# Patient Record
Sex: MALE | Race: WHITE | HISPANIC OR LATINO | Employment: FULL TIME | URBAN - METROPOLITAN AREA
[De-identification: names, ages, dates, MRNs, and addresses within clinical notes are randomized per-mention and may not be internally consistent; named-entity substitution may affect disease eponyms.]

---

## 2017-02-02 ENCOUNTER — HOSPITAL ENCOUNTER (EMERGENCY)
Facility: HOSPITAL | Age: 32
Discharge: HOME | End: 2017-02-02
Attending: EMERGENCY MEDICINE | Admitting: EMERGENCY MEDICINE

## 2017-02-02 VITALS
HEART RATE: 105 BPM | OXYGEN SATURATION: 97 % | DIASTOLIC BLOOD PRESSURE: 83 MMHG | TEMPERATURE: 99.1 F | HEIGHT: 71 IN | SYSTOLIC BLOOD PRESSURE: 134 MMHG | BODY MASS INDEX: 38.5 KG/M2 | RESPIRATION RATE: 18 BRPM | WEIGHT: 275 LBS

## 2017-02-02 DIAGNOSIS — J45.901 ASTHMA EXACERBATION: Primary | ICD-10-CM

## 2017-02-02 PROCEDURE — 99283 EMERGENCY DEPT VISIT LOW MDM: CPT

## 2017-02-02 RX ORDER — PROMETHAZINE HYDROCHLORIDE AND CODEINE PHOSPHATE 6.25; 1 MG/5ML; MG/5ML
5 SYRUP ORAL EVERY 4 HOURS PRN
Qty: 120 ML | Refills: 0 | Status: SHIPPED | OUTPATIENT
Start: 2017-02-02 | End: 2017-02-12

## 2017-02-02 RX ORDER — BUDESONIDE AND FORMOTEROL FUMARATE DIHYDRATE 160; 4.5 UG/1; UG/1
AEROSOL RESPIRATORY (INHALATION)
COMMUNITY
Start: 2016-11-14 | End: 2018-12-21 | Stop reason: SDUPTHER

## 2017-02-02 RX ORDER — ALBUTEROL SULFATE 90 UG/1
AEROSOL, METERED RESPIRATORY (INHALATION)
COMMUNITY
Start: 2015-11-17 | End: 2018-12-21 | Stop reason: SDUPTHER

## 2017-02-02 RX ORDER — PREDNISONE 20 MG/1
60 TABLET ORAL DAILY
Qty: 15 TABLET | Refills: 0 | Status: SHIPPED | OUTPATIENT
Start: 2017-02-02 | End: 2017-02-07

## 2017-02-02 RX ORDER — AZITHROMYCIN 250 MG/1
TABLET, FILM COATED ORAL
Qty: 6 TABLET | Refills: 0 | Status: SHIPPED | OUTPATIENT
Start: 2017-02-02 | End: 2018-12-05 | Stop reason: ALTCHOICE

## 2017-06-17 ENCOUNTER — APPOINTMENT (EMERGENCY)
Dept: ULTRASOUND IMAGING | Facility: HOSPITAL | Age: 32
End: 2017-06-17
Payer: OTHER MISCELLANEOUS

## 2017-06-17 ENCOUNTER — HOSPITAL ENCOUNTER (EMERGENCY)
Facility: HOSPITAL | Age: 32
Discharge: HOME | End: 2017-06-17
Attending: EMERGENCY MEDICINE | Admitting: EMERGENCY MEDICINE
Payer: OTHER MISCELLANEOUS

## 2017-06-17 VITALS
HEIGHT: 71 IN | WEIGHT: 275 LBS | BODY MASS INDEX: 38.5 KG/M2 | RESPIRATION RATE: 18 BRPM | TEMPERATURE: 99.2 F | OXYGEN SATURATION: 96 % | HEART RATE: 86 BPM | DIASTOLIC BLOOD PRESSURE: 64 MMHG | SYSTOLIC BLOOD PRESSURE: 141 MMHG

## 2017-06-17 DIAGNOSIS — S76.212A STRAIN OF GROIN, LEFT, INITIAL ENCOUNTER: Primary | ICD-10-CM

## 2017-06-17 PROCEDURE — 99283 EMERGENCY DEPT VISIT LOW MDM: CPT

## 2017-06-17 PROCEDURE — 76870 US EXAM SCROTUM: CPT

## 2017-06-17 RX ORDER — NAPROXEN 250 MG/1
500 TABLET ORAL ONCE
Status: COMPLETED | OUTPATIENT
Start: 2017-06-17 | End: 2017-06-17

## 2017-06-17 RX ORDER — NAPROXEN 500 MG/1
500 TABLET ORAL 2 TIMES DAILY WITH MEALS
Qty: 14 TABLET | Refills: 0 | Status: SHIPPED | OUTPATIENT
Start: 2017-06-17 | End: 2020-03-23 | Stop reason: ALTCHOICE

## 2017-06-17 RX ADMIN — NAPROXEN 500 MG: 250 TABLET ORAL at 16:07

## 2017-06-27 ENCOUNTER — APPOINTMENT (OUTPATIENT)
Dept: URGENT CARE | Facility: MEDICAL CENTER | Age: 32
End: 2017-06-27
Payer: OTHER MISCELLANEOUS

## 2017-06-27 PROCEDURE — 99283 EMERGENCY DEPT VISIT LOW MDM: CPT

## 2017-06-27 PROCEDURE — G0382 LEV 3 HOSP TYPE B ED VISIT: HCPCS

## 2017-06-30 ENCOUNTER — APPOINTMENT (OUTPATIENT)
Dept: URGENT CARE | Facility: MEDICAL CENTER | Age: 32
End: 2017-06-30
Payer: OTHER MISCELLANEOUS

## 2017-06-30 PROCEDURE — 99213 OFFICE O/P EST LOW 20 MIN: CPT

## 2017-07-07 ENCOUNTER — APPOINTMENT (OUTPATIENT)
Dept: URGENT CARE | Facility: MEDICAL CENTER | Age: 32
End: 2017-07-07
Payer: OTHER MISCELLANEOUS

## 2017-07-07 PROCEDURE — 99213 OFFICE O/P EST LOW 20 MIN: CPT

## 2018-10-05 ENCOUNTER — TELEPHONE (OUTPATIENT)
Dept: FAMILY MEDICINE CLINIC | Facility: CLINIC | Age: 33
End: 2018-10-05

## 2018-10-10 ENCOUNTER — TELEPHONE (OUTPATIENT)
Dept: FAMILY MEDICINE CLINIC | Facility: CLINIC | Age: 33
End: 2018-10-10

## 2018-10-10 NOTE — TELEPHONE ENCOUNTER
Pt left a message requesting a refill on her Inhaler  She needs an appt  Last office visit was 10/26/2016 as a new pt

## 2018-12-03 ENCOUNTER — HOSPITAL ENCOUNTER (EMERGENCY)
Facility: HOSPITAL | Age: 33
Discharge: HOME | End: 2018-12-03
Attending: EMERGENCY MEDICINE
Payer: COMMERCIAL

## 2018-12-03 VITALS
RESPIRATION RATE: 16 BRPM | OXYGEN SATURATION: 98 % | HEART RATE: 72 BPM | WEIGHT: 290 LBS | TEMPERATURE: 98.4 F | DIASTOLIC BLOOD PRESSURE: 91 MMHG | SYSTOLIC BLOOD PRESSURE: 167 MMHG | BODY MASS INDEX: 40.45 KG/M2

## 2018-12-03 DIAGNOSIS — G50.0 TRIGEMINAL NEURALGIA: Primary | ICD-10-CM

## 2018-12-03 PROCEDURE — 96372 THER/PROPH/DIAG INJ SC/IM: CPT

## 2018-12-03 PROCEDURE — 99283 EMERGENCY DEPT VISIT LOW MDM: CPT

## 2018-12-03 RX ORDER — GABAPENTIN 300 MG/1
300 CAPSULE ORAL
Qty: 5 CAPSULE | Refills: 0 | Status: SHIPPED | OUTPATIENT
Start: 2018-12-03 | End: 2020-03-23 | Stop reason: ALTCHOICE

## 2018-12-03 RX ORDER — KETOROLAC TROMETHAMINE 30 MG/ML
15 INJECTION, SOLUTION INTRAMUSCULAR; INTRAVENOUS ONCE
Status: COMPLETED | OUTPATIENT
Start: 2018-12-03 | End: 2018-12-03

## 2018-12-03 RX ORDER — DIAZEPAM 5 MG/ML
5 INJECTION, SOLUTION INTRAMUSCULAR; INTRAVENOUS ONCE
Status: COMPLETED | OUTPATIENT
Start: 2018-12-03 | End: 2018-12-03

## 2018-12-03 RX ADMIN — Medication 5 MG: at 09:58

## 2018-12-03 RX ADMIN — KETOROLAC TROMETHAMINE 15 MG: 30 INJECTION, SOLUTION INTRAMUSCULAR at 09:58

## 2018-12-03 NOTE — ED PROVIDER NOTES
History  Chief Complaint   Patient presents with    Jaw Pain     pt c/o jaw pain that radiates into his head that has been intermittent for the past week but became much worse yesterday     This is a 67-year-old male patient who states for approximately 1 week he has had pain in the right lower draw rating up his face and sometimes causing him a headache  Over last 2 days it has been more constant  The episodes are described as sharp and lancinating  He has tried Tylenol and Motrin with minimal improvement  No temple pain no blurred vision or double vision no photophobia  The pain does come and go  No recent dental work  Denies any toothache  Nothing makes it better or worse  No sign of Bradley's angina he denies any swelling of the face  No fever no chills no cough congestion sore throat no nausea vomiting diarrhea abdominal pain no chest pain or shortness of breath no urinary symptoms  Patient denies grinding his teeth  Prior to Admission Medications   Prescriptions Last Dose Informant Patient Reported? Taking? albuterol (VENTOLIN HFA) 90 mcg/act inhaler   Yes No   Sig: Inhale   azithromycin (ZITHROMAX) 250 mg tablet   No No   Sig: Take 500 mg day 1, 250 mg days 2-5   budesonide-formoterol (SYMBICORT) 160-4 5 mcg/act inhaler   Yes No   Sig: Inhale   ipratropium (ATROVENT) 0 02 % nebulizer solution   No No   Sig: Take 2 5 mL by nebulization 4 (four) times a day for 10 days   naproxen (NAPROSYN) 500 mg tablet   No No   Sig: Take 1 tablet by mouth 2 (two) times a day with meals for 7 days      Facility-Administered Medications: None       Past Medical History:   Diagnosis Date    Asthma        Past Surgical History:   Procedure Laterality Date    HERNIA REPAIR         Family History   Problem Relation Age of Onset    No Known Problems Father      I have reviewed and agree with the history as documented      Social History   Substance Use Topics    Smoking status: Never Smoker    Smokeless tobacco: Never Used    Alcohol use Yes      Comment: socially        Review of Systems   All other systems reviewed and are negative  Physical Exam  Physical Exam   Constitutional: He appears well-developed and well-nourished  HENT:   Head: Normocephalic and atraumatic  Right Ear: External ear normal    Left Ear: External ear normal    Nose: Nose normal    Mouth/Throat: Oropharynx is clear and moist    Eyes: Pupils are equal, round, and reactive to light  Conjunctivae are normal    Neck: Normal range of motion  Neck supple  Cardiovascular: Normal rate and regular rhythm  Pulmonary/Chest: Effort normal and breath sounds normal    Abdominal: Soft  Bowel sounds are normal  There is no tenderness  Neurological: He is alert  Skin: Skin is warm  Psychiatric: He has a normal mood and affect  His behavior is normal    Nursing note and vitals reviewed  Vital Signs  ED Triage Vitals [12/03/18 0859]   Temperature Pulse Respirations Blood Pressure SpO2   98 4 °F (36 9 °C) 72 16 167/91 98 %      Temp Source Heart Rate Source Patient Position - Orthostatic VS BP Location FiO2 (%)   Oral Monitor Sitting Left arm --      Pain Score       Worst Possible Pain           Vitals:    12/03/18 0859   BP: 167/91   Pulse: 72   Patient Position - Orthostatic VS: Sitting       Visual Acuity      ED Medications  Medications   ketorolac (TORADOL) injection 15 mg (15 mg Intramuscular Given 12/3/18 0958)   diazepam (VALIUM) injection 5 mg (5 mg Intramuscular Given 12/3/18 2141)       Diagnostic Studies  Results Reviewed     None                 No orders to display              Procedures  Procedures       Phone Contacts  ED Phone Contact    ED Course  ED Course as of Dec 04 0559   Mon Dec 03, 2018   1032 Patient was given Valium and Toradol eyes complete resolution of the pain symptoms  He is nontoxic in no acute distress I believe this to be of trigeminal neuralgia    Patient is to follow up with family doctor once a told him to continue                                MDM  CritCare Time    Disposition  Final diagnoses:   Trigeminal neuralgia     Time reflects when diagnosis was documented in both MDM as applicable and the Disposition within this note     Time User Action Codes Description Comment    12/3/2018 10:33 AM VinChivo barbozak Add [G50 0] Trigeminal neuralgia       ED Disposition     ED Disposition Condition Comment    Discharge  [facility] discharge to home/self care  Condition at discharge: Good        Follow-up Information     Follow up With Specialties Details Why [address] [facility], DO Family Medicine  As planned [facility] [address]  [address]  [phone]            Discharge Medication List as of 12/3/2018 10:34 AM      CONTINUE these medications which have NOT CHANGED    Details   albuterol (VENTOLIN HFA) 90 mcg/act inhaler Inhale, Starting 11/17/2015, Until Discontinued, Historical Med      azithromycin (ZITHROMAX) 250 mg tablet Take 500 mg day 1, 250 mg days 2-5, Print      budesonide-formoterol (SYMBICORT) 160-4 5 mcg/act inhaler Inhale, Starting 11/14/2016, Until Discontinued, Historical Med      ipratropium (ATROVENT) 0 02 % nebulizer solution Take 2 5 mL by nebulization 4 (four) times a day for 10 days, Starting 2/2/2017, Until Sun 2/12/17, Print      naproxen (NAPROSYN) 500 mg tablet Take 1 tablet by mouth 2 (two) times a day with meals for 7 days, Starting Sat 6/17/2017, Until Sat 6/24/2017, Print           No discharge procedures on file      ED Provider  Electronically Signed by           Mateo Hurd PA-C  12/04/18 1005

## 2018-12-03 NOTE — DISCHARGE INSTRUCTIONS
Trigeminal Neuralgia   WHAT YOU NEED TO KNOW:   Trigeminal neuralgia (TN) is a problem with your trigeminal nerve that causes severe facial pain  You have a trigeminal nerve on each side of your face  The nerves allow you to feel pain, touch, and temperature changes in different areas of your face  DISCHARGE INSTRUCTIONS:   Medicines:  Do not stop taking your medicines without talking with your healthcare provider first  Ken James can have a bad reaction if you stop your TN medicines suddenly  You may need any of the following:  · Anticonvulsants: These control seizures, help prevent pain attacks, and decrease symptoms  · Antidepressants: These decrease pain and help prevent depression  · Muscle relaxers:  When your facial muscles are relaxed, you may be less likely to have pain attacks  · Pain medicines: You may be given pain medicines if your facial pain is severe  · Take your medicine as directed  Contact your healthcare provider if you think your medicine is not helping or if you have side effects  Tell him or her if you are allergic to any medicine  Keep a list of the medicines, vitamins, and herbs you take  Include the amounts, and when and why you take them  Bring the list or the pill bottles to follow-up visits  Carry your medicine list with you in case of an emergency  Follow up with your healthcare provider as directed: You may need to have blood tests to check your blood levels of certain medicines  Ask how often you need to return to have these blood tests  Write down your questions so you remember to ask them during your visits  Manage your trigeminal neuralgia:  Even if you have not had symptoms for a long time, keep your medicines nearby  If your symptoms return, contact your healthcare provider before you start to take your medicines again  Contact your healthcare provider if:   · The medicines you are taking are not decreasing your pain      · You feel worried and depressed and find it hard to do your daily activities  · You have headaches, mouth sores, an upset stomach, or diarrhea  · Your pain feels worse, different, or moves to another area of your face  · You have questions or concerns about your condition or care  Return to the emergency department if:   · You have a fever, stiff neck, or you develop a skin rash or peeling skin  · You have clear or yellow fluid leaking from your procedure or surgery site  · You are feeling so depressed you want to harm yourself  · You are confused and cannot think clearly  · You are not eating or drinking, and you are losing weight  · You have eye pain, eye numbness, or sudden vision or hearing changes  · You have sudden dizziness, problems with movement, weakness, or numbness in your face  © 2017 [employer]  Information is for End User's use only and may not be sold, redistributed or otherwise used for commercial purposes  All illustrations and images included in CareNotes® are the copyrighted property of A D A M , Inc  or [employer] Vera  The above information is an  only  It is not intended as medical advice for individual conditions or treatments  Talk to your doctor, nurse or pharmacist before following any medical regimen to see if it is safe and effective for you

## 2018-12-05 ENCOUNTER — OFFICE VISIT (OUTPATIENT)
Dept: FAMILY MEDICINE CLINIC | Facility: CLINIC | Age: 33
End: 2018-12-05
Payer: COMMERCIAL

## 2018-12-05 VITALS
HEART RATE: 100 BPM | BODY MASS INDEX: 40.23 KG/M2 | OXYGEN SATURATION: 98 % | DIASTOLIC BLOOD PRESSURE: 82 MMHG | TEMPERATURE: 97.6 F | WEIGHT: 297 LBS | HEIGHT: 72 IN | SYSTOLIC BLOOD PRESSURE: 124 MMHG

## 2018-12-05 DIAGNOSIS — J45.40 MODERATE PERSISTENT ASTHMA WITHOUT COMPLICATION: Primary | ICD-10-CM

## 2018-12-05 DIAGNOSIS — K02.9 PAIN DUE TO DENTAL CARIES: ICD-10-CM

## 2018-12-05 DIAGNOSIS — Z13.1 SCREENING FOR DIABETES MELLITUS: ICD-10-CM

## 2018-12-05 DIAGNOSIS — Z13.6 SCREENING FOR CARDIOVASCULAR CONDITION: ICD-10-CM

## 2018-12-05 DIAGNOSIS — Z23 NEED FOR INFLUENZA VACCINATION: ICD-10-CM

## 2018-12-05 DIAGNOSIS — K08.9 DENTAL DISEASE: ICD-10-CM

## 2018-12-05 PROCEDURE — 3008F BODY MASS INDEX DOCD: CPT | Performed by: PHYSICIAN ASSISTANT

## 2018-12-05 PROCEDURE — 90686 IIV4 VACC NO PRSV 0.5 ML IM: CPT

## 2018-12-05 PROCEDURE — 99214 OFFICE O/P EST MOD 30 MIN: CPT | Performed by: PHYSICIAN ASSISTANT

## 2018-12-05 PROCEDURE — 90471 IMMUNIZATION ADMIN: CPT

## 2018-12-05 RX ORDER — AMOXICILLIN 500 MG/1
500 CAPSULE ORAL EVERY 8 HOURS SCHEDULED
Qty: 30 CAPSULE | Refills: 0
Start: 2018-12-05 | End: 2018-12-15

## 2018-12-05 NOTE — PROGRESS NOTES
Assessment/Plan:       Diagnoses and all orders for this visit:    Moderate persistent asthma without complication  -     CBC and differential; Future    Pain due to dental caries  -     amoxicillin (AMOXIL) 500 mg capsule; Take 1 capsule (500 mg total) by mouth every 8 (eight) hours for 10 days    Dental disease  -     amoxicillin (AMOXIL) 500 mg capsule; Take 1 capsule (500 mg total) by mouth every 8 (eight) hours for 10 days    Screening for cardiovascular condition  -     CBC and differential; Future  -     Comprehensive metabolic panel; Future  -     Lipid panel; Future    Screening for diabetes mellitus  -     CBC and differential; Future  -     Comprehensive metabolic panel; Future  -     Lipid panel; Future    Need for influenza vaccination  -     SYRINGE/SINGLE-DOSE VIAL: influenza vaccine, [id number], quadrivalent, 0 5 mL, preservative-free (AFLURIA, FLUARIX, FLULAVAL, FLUZONE)            Subjective:      Patient ID: Reza Celestin is a 26 y o  male  Patient is here for follow-up from the emergency room and to have his work wellness form completed  He needs lab work for that  Patient was in the emergency room for severe migraine which turns out to be a deep dental joann that is in his left jaw  Patient saw his dentist and has been referred to an oral surgeon  He was given gabapentin for bedtime along with hydrocodone for bedtime and amoxicillin  Headache    This is a new problem  The current episode started in the past 7 days  The problem occurs intermittently  The problem has been gradually improving  The pain is located in the right unilateral region  The pain radiates to the face  The pain quality is similar to prior headaches  The quality of the pain is described as throbbing  The patient is experiencing no pain  Pertinent negatives include no abdominal pain, coughing, dizziness, ear pain, eye pain, fever, numbness, sinus pressure, sore throat or tinnitus   The symptoms are aggravated by chewing  He has tried NSAIDs and oral narcotics for the symptoms  The treatment provided moderate relief  The following portions of the patient's history were reviewed and updated as appropriate:   He has a past medical history of Asthma ,   does not have any pertinent problems on file  ,   has a past surgical history that includes Hernia repair  ,  family history includes No Known Problems in his father  ,   reports that he has never smoked  He has never used smokeless tobacco  He reports that he drinks alcohol  He reports that he does not use drugs  ,  is allergic to shellfish-derived products     Current Outpatient Prescriptions   Medication Sig Dispense Refill    albuterol (VENTOLIN HFA) 90 mcg/act inhaler Inhale      budesonide-formoterol (SYMBICORT) 160-4 5 mcg/act inhaler Inhale      gabapentin (NEURONTIN) 300 mg capsule Take 1 capsule (300 mg total) by mouth daily at bedtime For post-herpetic neuralgia: Take 1 tablet on day 1,  Then take 2 tablets on day 2, Then take 3 tablets on day 3 and every day after that as instructed by your doctor  5 capsule 0    amoxicillin (AMOXIL) 500 mg capsule Take 1 capsule (500 mg total) by mouth every 8 (eight) hours for 10 days 30 capsule 0    ipratropium (ATROVENT) 0 02 % nebulizer solution Take 2 5 mL by nebulization 4 (four) times a day for 10 days 100 mL 0    naproxen (NAPROSYN) 500 mg tablet Take 1 tablet by mouth 2 (two) times a day with meals for 7 days 14 tablet 0     No current facility-administered medications for this visit  Review of Systems   Constitutional: Positive for appetite change  Negative for fever  HENT: Positive for dental problem and facial swelling  Negative for ear pain, mouth sores, postnasal drip, sinus pain, sinus pressure, sore throat, tinnitus, trouble swallowing and voice change  Eyes: Negative for pain  Respiratory: Negative for cough, chest tightness, shortness of breath and wheezing      Cardiovascular: Negative for chest pain, palpitations and leg swelling  Gastrointestinal: Negative for abdominal pain  Endocrine: Negative  Genitourinary: Negative for difficulty urinating  Musculoskeletal: Negative  Skin: Negative  Neurological: Positive for headaches  Negative for dizziness, speech difficulty and numbness  Hematological: Negative  Psychiatric/Behavioral: Negative  Objective:  Vitals:    12/05/18 1414   BP: 124/82   Pulse: 100   Temp: 97 6 °F (36 4 °C)   SpO2: 98%   Weight: 135 kg (297 lb)   Height: 5' 11 75" (1 822 m)     Body mass index is 40 56 kg/m²  Physical Exam   Constitutional: He is oriented to person, place, and time  He appears well-developed and well-nourished  HENT:   Head: Normocephalic  Right Ear: External ear normal    Left Ear: External ear normal    Mouth/Throat: Oropharynx is clear and moist    Eyes: Pupils are equal, round, and reactive to light  Conjunctivae are normal    Neck: Normal range of motion  No thyromegaly present  Cardiovascular: Normal rate, regular rhythm, normal heart sounds and intact distal pulses  Pulmonary/Chest: Effort normal and breath sounds normal    Abdominal: Soft  Bowel sounds are normal  He exhibits no mass  Musculoskeletal: Normal range of motion  Lymphadenopathy:     He has no cervical adenopathy  Neurological: He is alert and oriented to person, place, and time  He has normal reflexes  Skin: Skin is dry  Psychiatric: He has a normal mood and affect  His behavior is normal  Judgment and thought content normal    Nursing note and vitals reviewed

## 2018-12-05 NOTE — PATIENT INSTRUCTIONS
Thank you for enrolling in [facility]  Please follow the instructions below to securely access your online medical record  [employer] allows you to send messages to your doctor, view your test results, renew your prescriptions, schedule appointments, and more  [address] uses Single Sign on (SSO) Technology for you to log in and access our [facility], including [employer]  No more remembering multiple user names and passwords! We are going to guide you through, step by step, to help you set up your [facility] account which will provide access to your [employer] account  How Do I Sign Up? 1  In your Internet browser, go to [email or web address]       2  Click on the [facility] patient account and then click Dont have an                 Account? Create one now      3  Enter your demographic information and chose a user name (email address) and password  Think of one that is secure and easy to remember  Enter a Referral code if you have one (this is not your MyChart code ) Accept the Terms and Conditions and the Privacy Policy  4  Select your security questions that you will use to reset your password should you forget it  Click Submit  5  Enter your [employer] Activation Code exactly as it appears below  You will not need to use this code after you have completed the sign-up process  If you do not sign up before the expiration date, you must request a new code  [employer] Activation Code: [email or web address]  Expires: 12/17/2018 10:34 AM    6  Confirm your email address  An email confirmation was sent to you  Please open that email and click Confirm your Email   You should then be redirected to our [facility] Single sign on page, where you will log on with the user name and password you created! Proceed to the [employer] Icon to view your personal health information          Additional Information  If you have questions, you can e-mail patient  [email or web address] or call [phone] to talk to our customer support staff  Remember, MyChart is NOT to be used for urgent needs  For medical emergencies, dial 911

## 2018-12-06 ENCOUNTER — APPOINTMENT (OUTPATIENT)
Dept: LAB | Facility: HOSPITAL | Age: 33
End: 2018-12-06
Payer: COMMERCIAL

## 2018-12-06 DIAGNOSIS — J45.40 MODERATE PERSISTENT ASTHMA WITHOUT COMPLICATION: ICD-10-CM

## 2018-12-06 DIAGNOSIS — Z13.6 SCREENING FOR CARDIOVASCULAR CONDITION: ICD-10-CM

## 2018-12-06 DIAGNOSIS — Z13.1 SCREENING FOR DIABETES MELLITUS: ICD-10-CM

## 2018-12-06 LAB
ALBUMIN SERPL BCP-MCNC: 3.8 G/DL (ref 3.5–5)
ALP SERPL-CCNC: 77 U/L (ref 46–116)
ALT SERPL W P-5'-P-CCNC: 84 U/L (ref 12–78)
ANION GAP SERPL CALCULATED.3IONS-SCNC: 10 MMOL/L (ref 4–13)
AST SERPL W P-5'-P-CCNC: 32 U/L (ref 5–45)
BASOPHILS # BLD AUTO: 0.09 THOUSANDS/ΜL (ref 0–0.1)
BASOPHILS NFR BLD AUTO: 2 % (ref 0–1)
BILIRUB SERPL-MCNC: 0.9 MG/DL (ref 0.2–1)
BUN SERPL-MCNC: 12 MG/DL (ref 5–25)
CALCIUM SERPL-MCNC: 8.7 MG/DL (ref 8.3–10.1)
CHLORIDE SERPL-SCNC: 105 MMOL/L (ref 100–108)
CHOLEST SERPL-MCNC: 178 MG/DL (ref 50–200)
CO2 SERPL-SCNC: 28 MMOL/L (ref 21–32)
CREAT SERPL-MCNC: 0.95 MG/DL (ref 0.6–1.3)
EOSINOPHIL # BLD AUTO: 0.34 THOUSAND/ΜL (ref 0–0.61)
EOSINOPHIL NFR BLD AUTO: 6 % (ref 0–6)
ERYTHROCYTE [DISTWIDTH] IN BLOOD BY AUTOMATED COUNT: 13.1 % (ref 11.6–15.1)
GFR SERPL CREATININE-BSD FRML MDRD: 103 ML/MIN/1.73SQ M
GLUCOSE P FAST SERPL-MCNC: 110 MG/DL (ref 65–99)
HCT VFR BLD AUTO: 48.6 % (ref 36.5–49.3)
HDLC SERPL-MCNC: 49 MG/DL (ref 40–60)
HGB BLD-MCNC: 16.5 G/DL (ref 12–17)
IMM GRANULOCYTES # BLD AUTO: 0.02 THOUSAND/UL (ref 0–0.2)
IMM GRANULOCYTES NFR BLD AUTO: 0 % (ref 0–2)
LDLC SERPL CALC-MCNC: 80 MG/DL (ref 0–100)
LYMPHOCYTES # BLD AUTO: 1.41 THOUSANDS/ΜL (ref 0.6–4.47)
LYMPHOCYTES NFR BLD AUTO: 25 % (ref 14–44)
MCH RBC QN AUTO: 30.7 PG (ref 26.8–34.3)
MCHC RBC AUTO-ENTMCNC: 34 G/DL (ref 31.4–37.4)
MCV RBC AUTO: 90 FL (ref 82–98)
MONOCYTES # BLD AUTO: 0.66 THOUSAND/ΜL (ref 0.17–1.22)
MONOCYTES NFR BLD AUTO: 12 % (ref 4–12)
NEUTROPHILS # BLD AUTO: 3.2 THOUSANDS/ΜL (ref 1.85–7.62)
NEUTS SEG NFR BLD AUTO: 55 % (ref 43–75)
NONHDLC SERPL-MCNC: 129 MG/DL
NRBC BLD AUTO-RTO: 0 /100 WBCS
PLATELET # BLD AUTO: 238 THOUSANDS/UL (ref 149–390)
PMV BLD AUTO: 10.1 FL (ref 8.9–12.7)
POTASSIUM SERPL-SCNC: 4.5 MMOL/L (ref 3.5–5.3)
PROT SERPL-MCNC: 7.2 G/DL (ref 6.4–8.2)
RBC # BLD AUTO: 5.38 MILLION/UL (ref 3.88–5.62)
SODIUM SERPL-SCNC: 143 MMOL/L (ref 136–145)
TRIGL SERPL-MCNC: 244 MG/DL
WBC # BLD AUTO: 5.72 THOUSAND/UL (ref 4.31–10.16)

## 2018-12-06 PROCEDURE — 80053 COMPREHEN METABOLIC PANEL: CPT

## 2018-12-06 PROCEDURE — 36415 COLL VENOUS BLD VENIPUNCTURE: CPT

## 2018-12-06 PROCEDURE — 80061 LIPID PANEL: CPT

## 2018-12-06 PROCEDURE — 85025 COMPLETE CBC W/AUTO DIFF WBC: CPT

## 2018-12-20 RX ORDER — ALBUTEROL SULFATE 90 UG/1
AEROSOL, METERED RESPIRATORY (INHALATION)
Qty: 1 INHALER | Refills: 4 | OUTPATIENT
Start: 2018-12-20

## 2018-12-21 DIAGNOSIS — J45.40 MODERATE PERSISTENT ASTHMA WITHOUT COMPLICATION: Primary | ICD-10-CM

## 2018-12-21 RX ORDER — ALBUTEROL SULFATE 90 UG/1
1 AEROSOL, METERED RESPIRATORY (INHALATION) EVERY 4 HOURS PRN
Qty: 1 INHALER | Refills: 3 | Status: SHIPPED | OUTPATIENT
Start: 2018-12-21 | End: 2020-02-27

## 2018-12-21 RX ORDER — BUDESONIDE AND FORMOTEROL FUMARATE DIHYDRATE 160; 4.5 UG/1; UG/1
2 AEROSOL RESPIRATORY (INHALATION) 2 TIMES DAILY
Qty: 1 INHALER | Refills: 5 | Status: SHIPPED | OUTPATIENT
Start: 2018-12-21 | End: 2020-08-14 | Stop reason: SDUPTHER

## 2020-02-27 DIAGNOSIS — J45.40 MODERATE PERSISTENT ASTHMA WITHOUT COMPLICATION: ICD-10-CM

## 2020-02-27 RX ORDER — ALBUTEROL SULFATE 90 UG/1
1 AEROSOL, METERED RESPIRATORY (INHALATION) EVERY 4 HOURS PRN
Qty: 8.5 INHALER | Refills: 0 | Status: SHIPPED | OUTPATIENT
Start: 2020-02-27 | End: 2020-03-23 | Stop reason: SDUPTHER

## 2020-02-27 NOTE — TELEPHONE ENCOUNTER
Requested medication(s) are due for refill today: Yes  Patient has already received a courtesy refill: No  Other reason request has been forwarded to provider:    Patient made appointment 3/23/2020  Please advise courtesy refill, Thank you

## 2020-03-20 ENCOUNTER — TELEPHONE (OUTPATIENT)
Dept: INTERNAL MEDICINE CLINIC | Facility: OTHER | Age: 35
End: 2020-03-20

## 2020-03-23 ENCOUNTER — TELEMEDICINE (OUTPATIENT)
Dept: FAMILY MEDICINE CLINIC | Facility: CLINIC | Age: 35
End: 2020-03-23
Payer: COMMERCIAL

## 2020-03-23 DIAGNOSIS — J45.40 MODERATE PERSISTENT ASTHMA WITHOUT COMPLICATION: ICD-10-CM

## 2020-03-23 DIAGNOSIS — J45.40 MODERATE PERSISTENT ASTHMA WITHOUT COMPLICATION: Primary | ICD-10-CM

## 2020-03-23 PROBLEM — K08.9 DENTAL DISEASE: Status: RESOLVED | Noted: 2018-12-05 | Resolved: 2020-03-23

## 2020-03-23 PROBLEM — K02.9 PAIN DUE TO DENTAL CARIES: Status: RESOLVED | Noted: 2018-12-05 | Resolved: 2020-03-23

## 2020-03-23 PROCEDURE — G2012 BRIEF CHECK IN BY MD/QHP: HCPCS | Performed by: PHYSICIAN ASSISTANT

## 2020-03-23 RX ORDER — ALBUTEROL SULFATE 90 UG/1
1 AEROSOL, METERED RESPIRATORY (INHALATION) EVERY 4 HOURS PRN
Qty: 8.5 INHALER | Refills: 3 | Status: SHIPPED | OUTPATIENT
Start: 2020-03-23 | End: 2020-08-14 | Stop reason: SDUPTHER

## 2020-03-23 NOTE — PROGRESS NOTES
Virtual Brief Visit    Reason for visit is for refill of his medications for his moderate persistent asthma without complication  Encounter provider Vivian Merino PA-C    Provider located at [address]  [address] [facility]  [address]  [phone]      Recent Visits  No visits were found meeting these conditions  Showing recent visits within past 7 days and meeting all other requirements     Future Appointments  No visits were found meeting these conditions  Showing future appointments within next 150 days and meeting all other requirements        Patient agrees to participate in a virtual check in via telephone or video visit instead of presenting to the office to address urgent/immediate medical needs  Patient is aware this is a billable service  After connecting through telephone, the patient was identified by name and date of birth  Uyen Devlin was informed that this was a telemedicine visit and that the visit is being conducted through telephone which may not be secure and therefore might not be HIPAA-compliant  My office door was closed  No one else was in the room  He acknowledged consent and understanding of privacy and security of the virtual check-in visit  I informed the patient that I have reviewed his record in Epic and presented the opportunity for him to ask any questions regarding the visit today  The patient initiated communication and agreed to participate  Subjective  Uyen Devlin is a 37 y o  male who is a   He usually gets exacerbation of his asthma in spring and fall an uses his Symbicort at that time  He also uses a Ventolin inhaler as needed and Atrovent in his nebulizer as needed  He says right now he is doing well other than being tired from  frequently  He has no changes in his medical health    He needs renewal of his Ventolin and Atrovent  Past Medical History:   Diagnosis Date    Asthma        Past Surgical History:   Procedure Laterality Date    HERNIA REPAIR         Current Outpatient Medications   Medication Sig Dispense Refill    albuterol (PROVENTIL HFA,VENTOLIN HFA) 90 mcg/act inhaler INHALE 1 PUFF EVERY 4 (FOUR) HOURS AS NEEDED FOR WHEEZING 8 5 Inhaler 0    budesonide-formoterol (SYMBICORT) 160-4 5 mcg/act inhaler Inhale 2 puffs 2 (two) times a day 1 Inhaler 5    ipratropium (ATROVENT) 0 02 % nebulizer solution Take 2 5 mL by nebulization 4 (four) times a day for 10 days 100 mL 0     No current facility-administered medications for this visit  Allergies   Allergen Reactions    Shellfish-Derived Products Anaphylaxis       Assessment    Mo telephone assessment is Generally healthy   He has no complaints at this time other that and he needs his medications refilled  He states he does have a Symbicort inhaler if he starts to have problems with the pollen  Disposition:    Ventolin and Atrovent are renewed  CBC and CMP are ordered  Patient is to follow-up in 6 months  I spent 15 minutes with the patient during this virtual check-in visit    I would of charge this patient for an annual physical

## 2020-08-14 ENCOUNTER — OFFICE VISIT (OUTPATIENT)
Dept: FAMILY MEDICINE CLINIC | Facility: CLINIC | Age: 35
End: 2020-08-14
Payer: COMMERCIAL

## 2020-08-14 VITALS
DIASTOLIC BLOOD PRESSURE: 78 MMHG | BODY MASS INDEX: 40.09 KG/M2 | OXYGEN SATURATION: 98 % | TEMPERATURE: 97.4 F | HEIGHT: 72 IN | HEART RATE: 100 BPM | WEIGHT: 296 LBS | SYSTOLIC BLOOD PRESSURE: 126 MMHG

## 2020-08-14 DIAGNOSIS — L23.7 POISON IVY DERMATITIS: ICD-10-CM

## 2020-08-14 DIAGNOSIS — E66.01 CLASS 3 SEVERE OBESITY DUE TO EXCESS CALORIES WITHOUT SERIOUS COMORBIDITY WITH BODY MASS INDEX (BMI) OF 40.0 TO 44.9 IN ADULT (HCC): ICD-10-CM

## 2020-08-14 DIAGNOSIS — J45.40 MODERATE PERSISTENT ASTHMA WITHOUT COMPLICATION: ICD-10-CM

## 2020-08-14 DIAGNOSIS — Z00.00 ANNUAL PHYSICAL EXAM: Primary | ICD-10-CM

## 2020-08-14 PROCEDURE — 99395 PREV VISIT EST AGE 18-39: CPT | Performed by: FAMILY MEDICINE

## 2020-08-14 RX ORDER — ALBUTEROL SULFATE 90 UG/1
1 AEROSOL, METERED RESPIRATORY (INHALATION) EVERY 4 HOURS PRN
Qty: 8.5 INHALER | Refills: 3 | Status: SHIPPED | OUTPATIENT
Start: 2020-08-14 | End: 2021-01-23 | Stop reason: SDUPTHER

## 2020-08-14 RX ORDER — BUDESONIDE AND FORMOTEROL FUMARATE DIHYDRATE 160; 4.5 UG/1; UG/1
2 AEROSOL RESPIRATORY (INHALATION) 2 TIMES DAILY
Qty: 1 INHALER | Refills: 5 | Status: SHIPPED | OUTPATIENT
Start: 2020-08-14 | End: 2021-01-23 | Stop reason: SDUPTHER

## 2020-08-14 RX ORDER — PREDNISONE 10 MG/1
TABLET ORAL
Qty: 47 TABLET | Refills: 0 | Status: SHIPPED | OUTPATIENT
Start: 2020-08-14 | End: 2020-09-01 | Stop reason: ALTCHOICE

## 2020-08-14 RX ORDER — TRIAMCINOLONE ACETONIDE 5 MG/G
CREAM TOPICAL 2 TIMES DAILY
Qty: 15 G | Refills: 0 | Status: SHIPPED | OUTPATIENT
Start: 2020-08-14 | End: 2020-08-24

## 2020-08-14 NOTE — PROGRESS NOTES
[address]     NAME: Phi Lindsey  AGE: 38 y o  SEX: male  : 1985     DATE: 2020     Assessment and Plan:     Problem List Items Addressed This Visit        Respiratory    Asthma    Relevant Medications    albuterol (PROVENTIL HFA,VENTOLIN HFA) 90 mcg/act inhaler    budesonide-formoterol (Symbicort) 160-4 5 mcg/act inhaler      Other Visit Diagnoses     Annual physical exam    -  Primary    Class 3 severe obesity due to excess calories without serious comorbidity with body mass index (BMI) of 40 0 to 44 9 in adult [facility])        Relevant Orders    Lipid panel    Poison ivy dermatitis        Relevant Medications    triamcinolone (KENALOG) 0 5 % cream    predniSONE 10 mg tablet        Immunizations and preventive care screenings were discussed with patient today  Appropriate education was printed on patient's after visit summary  Counseling:  Alcohol/drug use: discussed moderation in alcohol intake, the recommendations for healthy alcohol use, and avoidance of illicit drug use  Dental Health: discussed importance of regular tooth brushing, flossing, and dental visits  Injury prevention: discussed safety/seat belts, safety helmets, smoke detectors, carbon dioxide detectors, and smoking near bedding or upholstery  Sexual health: discussed sexually transmitted diseases, partner selection, use of condoms, avoidance of unintended pregnancy, and contraceptive alternatives  · Exercise: the importance of regular exercise/physical activity was discussed  Recommend exercise 3-5 times per week for at least 30 minutes  BMI Counseling: Body mass index is 40 71 kg/m²   The BMI is above normal  Nutrition recommendations include decreasing portion sizes, encouraging healthy choices of fruits and vegetables, decreasing fast food intake, consuming healthier snacks, limiting drinks that contain sugar, moderation in carbohydrate intake and reducing intake of cholesterol  Exercise recommendations include moderate physical activity 150 minutes/week  No pharmacotherapy was ordered  Return in 6 months (on 2/14/2021) for f/u BMI  Chief Complaint:     Chief Complaint   Patient presents with    Rash     Possible Poison Alejo Perez Physical Exam      History of Present Illness:     Adult Annual Physical   Patient here for a comprehensive physical exam  The patient reports problems - poison ivy  Was doing yard work about 1 week ago and it continues to spread  Diet and Physical Activity  · Diet/Nutrition: poor diet and frequent junk food  Notes that he has been working on trying intermittent fasting and cutting down on portions  · Exercise: no formal exercise  Depression Screening  PHQ-9 Depression Screening    PHQ-9:    Frequency of the following problems over the past two weeks:       Little interest or pleasure in doing things:  0 - not at all  Feeling down, depressed, or hopeless:  0 - not at all  PHQ-2 Score:  0       General Health  · Sleep: sleeps well  · Hearing: normal - bilateral   · Vision: no vision problems  · Dental: no dental visits for >1 year and brushes teeth once daily   Health  · History of STDs?: no      Review of Systems:     Review of Systems   Constitutional: Negative for chills, fatigue and fever  HENT: Negative for congestion, ear pain, rhinorrhea, sinus pain and sore throat  Eyes: Negative for pain  Respiratory: Negative for cough and shortness of breath  Cardiovascular: Negative for chest pain and leg swelling  Gastrointestinal: Negative for abdominal pain, constipation, diarrhea, nausea and vomiting  Genitourinary: Negative for difficulty urinating, dysuria, frequency and urgency  Musculoskeletal: Negative for neck pain  Skin: Positive for rash  Neurological: Negative for dizziness and headaches     Psychiatric/Behavioral: Negative for sleep disturbance           Past Medical History:     Past Medical History:   Diagnosis Date    Asthma       Past Surgical History:     Past Surgical History:   Procedure Laterality Date    HERNIA REPAIR        Social History:        Social History     Socioeconomic History    Marital status: Single     Spouse name: None    Number of children: None    Years of education: None    Highest education level: None   Occupational History    Occupation:      Employer: [employer]   Social Needs    Financial resource strain: None    Food insecurity     Worry: None     Inability: None    Transportation needs     Medical: None     Non-medical: None   Tobacco Use    Smoking status: Never Smoker    Smokeless tobacco: Never Used   Substance and Sexual Activity    Alcohol use: Yes     Comment: socially    Drug use: No    Sexual activity: None   Lifestyle    Physical activity     Days per week: None     Minutes per session: None    Stress: None   Relationships    Social connections     Talks on phone: None     Gets together: None     Attends Confucianism service: None     Active member of club or organization: None     Attends meetings of clubs or organizations: None     Relationship status: None    Intimate partner violence     Fear of current or ex partner: None     Emotionally abused: None     Physically abused: None     Forced sexual activity: None   Other Topics Concern    None   Social History Narrative    Exercise; Walking      Family History:     Family History   Problem Relation Age of Onset    No Known Problems Father     Diabetes Mother     Diabetes Maternal Grandmother       Current Medications:     Current Outpatient Medications   Medication Sig Dispense Refill    albuterol (PROVENTIL HFA,VENTOLIN HFA) 90 mcg/act inhaler Inhale 1 puff every 4 (four) hours as needed for wheezing 8 5 Inhaler 3    ipratropium (ATROVENT) 0 02 % nebulizer solution Take 1 vial (0 5 mg total) by nebulization 4 (four) times a day for 10 days 100 mL 0    budesonide-formoterol (Symbicort) 160-4 5 mcg/act inhaler Inhale 2 puffs 2 (two) times a day 1 Inhaler 5    predniSONE 10 mg tablet Take 5 tablets (50 mg total) by mouth daily for 3 days, THEN 4 tablets (40 mg total) daily for 3 days, THEN 3 tablets (30 mg total) daily for 3 days, THEN 2 tablets (20 mg total) daily for 3 days, THEN 1 tablet (10 mg total) daily for 3 days, THEN 0 5 tablets (5 mg total) daily for 3 days  47 tablet 0    triamcinolone (KENALOG) 0 5 % cream Apply topically 2 (two) times a day for 10 days 15 g 0     No current facility-administered medications for this visit  Allergies: Allergies   Allergen Reactions    Shellfish-Derived Products Anaphylaxis      Physical Exam:     /78 (BP Location: Left arm, Patient Position: Sitting)   Pulse 100   Temp (!) 97 4 °F (36 3 °C) (Tympanic)   Ht 5' 11 5" (1 816 m)   Wt 134 kg (296 lb)   SpO2 98%   BMI 40 71 kg/m²     Physical Exam  Vitals signs reviewed  Constitutional:       General: He is not in acute distress  Appearance: Normal appearance  He is obese  HENT:      Head: Normocephalic and atraumatic  Right Ear: External ear normal       Left Ear: External ear normal       Nose: Nose normal       Mouth/Throat:      Mouth: Mucous membranes are moist       Pharynx: No oropharyngeal exudate or posterior oropharyngeal erythema  Eyes:      Extraocular Movements: Extraocular movements intact  Conjunctiva/sclera: Conjunctivae normal       Pupils: Pupils are equal, round, and reactive to light  Neck:      Musculoskeletal: Neck supple  Cardiovascular:      Rate and Rhythm: Normal rate and regular rhythm  Heart sounds: Normal heart sounds  Pulmonary:      Effort: Pulmonary effort is normal       Breath sounds: Normal breath sounds  No wheezing, rhonchi or rales  Abdominal:      General: Bowel sounds are normal  There is no distension  Palpations: Abdomen is soft  Tenderness: There is no abdominal tenderness  Musculoskeletal:      Right lower leg: No edema  Left lower leg: No edema  Lymphadenopathy:      Cervical: No cervical adenopathy  Skin:     General: Skin is warm  Capillary Refill: Capillary refill takes less than 2 seconds  Findings: Rash (B/L LE (ankles): erythematous patches with linear pattern as well  small fluid filld vesicles present ) present  Neurological:      Mental Status: He is alert  Mental status is at baseline            Ana Quiles [address]

## 2020-08-14 NOTE — PATIENT INSTRUCTIONS
Please go to the lab and have your blood work done  Please take the Prednisone as perscribed  This is a long taper  You can use the Triamcinolone topically for 5-7 days  Call the office with questions or concerns  Wellness Visit for Adults   AMBULATORY CARE:   A wellness visit  is when you see your healthcare provider to get screened for health problems  You can also get advice on how to stay healthy  Write down your questions so you remember to ask them  Ask your healthcare provider how often you should have a wellness visit  What happens at a wellness visit:  Your healthcare provider will ask about your health, and your family history of health problems  This includes high blood pressure, heart disease, and cancer  He or she will ask if you have symptoms that concern you, if you smoke, and about your mood  You may also be asked about your intake of medicines, supplements, food, and alcohol  Any of the following may be done:  · Your weight  will be checked  Your height may also be checked so your body mass index (BMI) can be calculated  Your BMI shows if you are at a healthy weight  · Your blood pressure  and heart rate will be checked  Your temperature may also be checked  · Blood and urine tests  may be done  Blood tests may be done to check your cholesterol levels  Abnormal cholesterol levels increase your risk for heart disease and stroke  You may also need a blood or urine test to check for diabetes if you are at increased risk  Urine tests may be done to look for signs of an infection or kidney disease  · A physical exam  includes checking your heartbeat and lungs with a stethoscope  Your healthcare provider may also check your skin to look for sun damage  · Screening tests  may be recommended  A screening test is done to check for diseases that may not cause symptoms  The screening tests you may need depend on your age, gender, family history, and lifestyle habits   For example, colorectal screening may be recommended if you are 48years old or older  Screening tests you need if you are a woman:   · A Pap smear  is used to screen for cervical cancer  Pap smears are usually done every 3 to 5 years depending on your age  You may need them more often if you have had abnormal Pap smear test results in the past  Ask your healthcare provider how often you should have a Pap smear  · A mammogram  is an x-ray of your breasts to screen for breast cancer  Experts recommend mammograms every 2 years starting at age 48 years  You may need a mammogram at age 52 years or younger if you have an increased risk for breast cancer  Talk to your healthcare provider about when you should start having mammograms and how often you need them  Vaccines you may need:   · Get an influenza vaccine  every year  The influenza vaccine protects you from the flu  Several types of viruses cause the flu  The viruses change over time, so new vaccines are made each year  · Get a tetanus-diphtheria (Td) booster vaccine  every 10 years  This vaccine protects you against tetanus and diphtheria  Tetanus is a severe infection that may cause painful muscle spasms and lockjaw  Diphtheria is a severe bacterial infection that causes a thick covering in the back of your mouth and throat  · Get a human papillomavirus (HPV) vaccine  if you are female and aged 23 to 32 or male 23 to 24 and never received it  This vaccine protects you from HPV infection  HPV is the most common infection spread by sexual contact  HPV may also cause vaginal, penile, and anal cancers  · Get a pneumococcal vaccine  if you are aged 72 years or older  The pneumococcal vaccine is an injection given to protect you from pneumococcal disease  Pneumococcal disease is an infection caused by pneumococcal bacteria  The infection may cause pneumonia, meningitis, or an ear infection      · Get a shingles vaccine  if you are aged 61 or older, even if you have had shingles before  The shingles vaccine is an injection to protect you from the varicella-zoster virus  This is the same virus that causes chickenpox  Shingles is a painful rash that develops in people who had chickenpox or have been exposed to the virus  How to eat healthy:  My Plate is a model for planning healthy meals  It shows the types and amounts of foods that should go on your plate  Fruits and vegetables make up about half of your plate, and grains and protein make up the other half  A serving of dairy is included on the side of your plate  The amount of calories and serving sizes you need depends on your age, gender, weight, and height  Examples of healthy foods are listed below:  · Eat a variety of vegetables  such as dark green, red, and orange vegetables  You can also include canned vegetables low in sodium (salt) and frozen vegetables without added butter or sauces  · Eat a variety of fresh fruits , canned fruit in 100% juice, frozen fruit, and dried fruit  · Include whole grains  At least half of the grains you eat should be whole grains  Examples include whole-wheat bread, wheat pasta, brown rice, and whole-grain cereals such as oatmeal     · Eat a variety of protein foods such as seafood (fish and shellfish), lean meat, and poultry without skin (turkey and chicken)  Examples of lean meats include pork leg, shoulder, or tenderloin, and beef round, sirloin, tenderloin, and extra lean ground beef  Other protein foods include eggs and egg substitutes, beans, peas, soy products, nuts, and seeds  · Choose low-fat dairy products such as skim or 1% milk or low-fat yogurt, cheese, and cottage cheese  · Limit unhealthy fats  such as butter, hard margarine, and shortening  Exercise:  Exercise at least 30 minutes per day on most days of the week  Some examples of exercise include walking, biking, dancing, and swimming   You can also fit in more physical activity by taking the stairs instead of the elevator or parking farther away from stores  Include muscle strengthening activities 2 days each week  Regular exercise provides many health benefits  It helps you manage your weight, and decreases your risk for type 2 diabetes, heart disease, stroke, and high blood pressure  Exercise can also help improve your mood  Ask your healthcare provider about the best exercise plan for you  General health and safety guidelines:   · Do not smoke  Nicotine and other chemicals in cigarettes and cigars can cause lung damage  Ask your healthcare provider for information if you currently smoke and need help to quit  E-cigarettes or smokeless tobacco still contain nicotine  Talk to your healthcare provider before you use these products  · Limit alcohol  A drink of alcohol is 12 ounces of beer, 5 ounces of wine, or 1½ ounces of liquor  · Lose weight, if needed  Being overweight increases your risk of certain health conditions  These include heart disease, high blood pressure, type 2 diabetes, and certain types of cancer  · Protect your skin  Do not sunbathe or use tanning beds  Use sunscreen with a SPF 15 or higher  Apply sunscreen at least 15 minutes before you go outside  Reapply sunscreen every 2 hours  Wear protective clothing, hats, and sunglasses when you are outside  · Drive safely  Always wear your seatbelt  Make sure everyone in your car wears a seatbelt  A seatbelt can save your life if you are in an accident  Do not use your cell phone when you are driving  This could distract you and cause an accident  Pull over if you need to make a call or send a text message  · Practice safe sex  Use latex condoms if are sexually active and have more than one partner  Your healthcare provider may recommend screening tests for sexually transmitted infections (STIs)  · Wear helmets, lifejackets, and protective gear    Always wear a helmet when you ride a bike or motorcycle, go skiing, or play sports that could cause a head injury  Wear protective equipment when you play sports  Wear a lifejacket when you are on a boat or doing water sports  © 2017 [address] Information is for End User's use only and may not be sold, redistributed or otherwise used for commercial purposes  All illustrations and images included in CareNotes® are the copyrighted property of [employer]  or [employer]  The above information is an  only  It is not intended as medical advice for individual conditions or treatments  Talk to your doctor, nurse or pharmacist before following any medical regimen to see if it is safe and effective for you  Obesity   AMBULATORY CARE:   Obesity  is when your body mass index (BMI) is greater than 30  Your healthcare provider will use your height and weight to measure your BMI  The risks of obesity include  many health problems, such as injuries or physical disability  You may need tests to check for the following:  · Diabetes     · High blood pressure or high cholesterol     · Heart disease     · Gallbladder or liver disease     · Cancer of the colon, breast, prostate, liver, or kidney     · Sleep apnea     · Arthritis or gout  Seek care immediately if:   · You have a severe headache, confusion, or difficulty speaking  · You have weakness on one side of your body  · You have chest pain, sweating, or shortness of breath  Contact your healthcare provider if:   · You have symptoms of gallbladder or liver disease, such as pain in your upper abdomen  · You have knee or hip pain and discomfort while walking  · You have symptoms of diabetes, such as intense hunger and thirst, and frequent urination  · You have symptoms of sleep apnea, such as snoring or daytime sleepiness  · You have questions or concerns about your condition or care  Treatment for obesity  focuses on helping you lose weight to improve your health   Even a small decrease in BMI can reduce the risk for many health problems  Your healthcare provider will help you set a weight-loss goal   · Lifestyle changes  are the first step in treating obesity  These include making healthy food choices and getting regular physical activity  Your healthcare provider may suggest a weight-loss program that involves coaching, education, and therapy  · Medicine  may help you lose weight when it is used with a healthy diet and physical activity  · Surgery  can help you lose weight if you are very obese and have other health problems  There are several types of weight-loss surgery  Ask your healthcare provider for more information  Be successful losing weight:   · Set small, realistic goals  An example of a small goal is to walk for 20 minutes 5 days a week  Anther goal is to lose 5% of your body weight  · Tell friends, family members, and coworkers about your goals  and ask for their support  Ask a friend to lose weight with you, or join a weight-loss support group  · Identify foods or triggers that may cause you to overeat , and find ways to avoid them  Remove tempting high-calorie foods from your home and workplace  Place a bowl of fresh fruit on your kitchen counter  If stress causes you to eat, then find other ways to cope with stress  · Keep a diary to track what you eat and drink  Also write down how many minutes of physical activity you do each day  Weigh yourself once a week and record it in your diary  Eating changes: You will need to eat 500 to 1,000 fewer calories each day than you currently eat to lose 1 to 2 pounds a week  The following changes will help you cut calories:  · Eat smaller portions  Use small plates, no larger than 9 inches in diameter  Fill your plate half full of fruits and vegetables  Measure your food using measuring cups until you know what a serving size looks like  · Eat 3 meals and 1 or 2 snacks each day    Plan your meals in advance  Jocelyne Almonte and eat at home most of the time  Eat slowly  · Eat fruits and vegetables at every meal   They are low in calories and high in fiber, which makes you feel full  Do not add butter, margarine, or cream sauce to vegetables  Use herbs to season steamed vegetables  · Eat less fat and fewer fried foods  Eat more baked or grilled chicken and fish  These protein sources are lower in calories and fat than red meat  Limit fast food  Dress your salads with olive oil and vinegar instead of bottled dressing  · Limit the amount of sugar you eat  Do not drink sugary beverages  Limit alcohol  Activity changes:  Physical activity is good for your body in many ways  It helps you burn calories and build strong muscles  It decreases stress and depression, and improves your mood  It can also help you sleep better  Talk to your healthcare provider before you begin an exercise program   · Exercise for at least 30 minutes 5 days a week  Start slowly  Set aside time each day for physical activity that you enjoy and that is convenient for you  It is best to do both weight training and an activity that increases your heart rate, such as walking, bicycling, or swimming  · Find ways to be more active  Do yard work and housecleaning  Walk up the stairs instead of using elevators  Spend your leisure time going to events that require walking, such as outdoor festivals or fairs  This extra physical activity can help you lose weight and keep it off  Follow up with your healthcare provider as directed: You may need to meet with a dietitian  Write down your questions so you remember to ask them during your visits  © 2017 [address] Information is for End User's use only and may not be sold, redistributed or otherwise used for commercial purposes  All illustrations and images included in CareNotes® are the copyrighted property of [employer]  or [employer]    The above information is an  only  It is not intended as medical advice for individual conditions or treatments  Talk to your doctor, nurse or pharmacist before following any medical regimen to see if it is safe and effective for you

## 2020-09-01 ENCOUNTER — TELEMEDICINE (OUTPATIENT)
Dept: FAMILY MEDICINE CLINIC | Facility: CLINIC | Age: 35
End: 2020-09-01
Payer: COMMERCIAL

## 2020-09-01 VITALS — WEIGHT: 288 LBS | HEIGHT: 72 IN | BODY MASS INDEX: 39.01 KG/M2

## 2020-09-01 DIAGNOSIS — G56.22 IRRITATION OF LEFT ULNAR NERVE: ICD-10-CM

## 2020-09-01 DIAGNOSIS — J45.40 MODERATE PERSISTENT ASTHMA WITHOUT COMPLICATION: Primary | ICD-10-CM

## 2020-09-01 PROCEDURE — 99213 OFFICE O/P EST LOW 20 MIN: CPT | Performed by: PHYSICIAN ASSISTANT

## 2020-09-01 NOTE — PROGRESS NOTES
Virtual Regular Visit      Assessment/Plan:    Problem List Items Addressed This Visit        Respiratory    Asthma - Primary       Nervous and Auditory    Irritation of left ulnar nerve               Reason for visit is   Chief Complaint   Patient presents with    Follow-up    Virtual Regular Visit        Encounter provider Dano Guzman PA-C    Provider located at [facility] [address] [facility] [address]  [facility] [address]  [phone]      Recent Visits  No visits were found meeting these conditions  Showing recent visits within past 7 days and meeting all other requirements     Today's Visits  Date Type Provider Dept   09/01/20 Telemedicine Dano Guzman PA-C [facility] [address] [place]   Showing today's visits and meeting all other requirements     Future Appointments  No visits were found meeting these conditions  Showing future appointments within next 150 days and meeting all other requirements        The patient was identified by name and date of birth  Usha Clayton was informed that this is a telemedicine visit and that the visit is being conducted through [facility] and patient was informed that this is a secure, HIPAA-compliant platform  He agrees to proceed     My office door was closed  No one else was in the room  He acknowledged consent and understanding of privacy and security of the video platform  The patient has agreed to participate and understands they can discontinue the visit at any time  Patient is aware this is a billable service  Subjective  Usha Clayton is a 38 y o  male for follow-up   Patient is doing better as far as his contact dermatitis is concerned  He is done with the prednisone as of today  If he continues to have any itching or irritation he will take Benadryl or Claritin on an as-needed basis  He still has the cream that he can also use    Patient does notice that he has some tingling of his left pinky and ring finger at times  It tends to coming go on it has only been over the last several days  He is to ice the area of his elbow and see if it improves with rest        Past Medical History:   Diagnosis Date    Asthma        Past Surgical History:   Procedure Laterality Date    HERNIA REPAIR         Current Outpatient Medications   Medication Sig Dispense Refill    albuterol (PROVENTIL HFA,VENTOLIN HFA) 90 mcg/act inhaler Inhale 1 puff every 4 (four) hours as needed for wheezing 8 5 Inhaler 3    budesonide-formoterol (Symbicort) 160-4 5 mcg/act inhaler Inhale 2 puffs 2 (two) times a day 1 Inhaler 5    ipratropium (ATROVENT) 0 02 % nebulizer solution Take 1 vial (0 5 mg total) by nebulization 4 (four) times a day for 10 days 100 mL 0    triamcinolone (KENALOG) 0 5 % cream Apply topically 2 (two) times a day for 10 days 15 g 0     No current facility-administered medications for this visit  Allergies   Allergen Reactions    Shellfish-Derived Products Anaphylaxis       Review of Systems   Constitutional: Negative for activity change, appetite change, fatigue and fever  HENT: Negative for congestion and trouble swallowing  Respiratory: Negative for cough, chest tightness, shortness of breath and wheezing  Cardiovascular: Negative for chest pain, palpitations and leg swelling  Gastrointestinal: Negative for abdominal pain and nausea  Musculoskeletal: Positive for arthralgias  Negative for back pain, gait problem, joint swelling and neck pain  Neurological: Negative for dizziness, weakness, light-headedness and headaches  Tingling of left ring and pinky finger       Video Exam    Vitals:    09/01/20 0959   Weight: 131 kg (288 lb)   Height: 5' 11 5" (1 816 m)       Physical Exam  Constitutional:       Appearance: Normal appearance     HENT:      Right Ear: External ear normal       Left Ear: External ear normal    Eyes: Conjunctiva/sclera: Conjunctivae normal    Neck:      Musculoskeletal: Normal range of motion  Pulmonary:      Effort: Pulmonary effort is normal    Musculoskeletal:         General: No swelling  Skin:     Findings: Rash present  Comments: Small areas of some erythema still noticeable on lower extremities  No drainage or discharge noted   Neurological:      Mental Status: He is alert and oriented to person, place, and time  Psychiatric:         Mood and Affect: Mood normal          Behavior: Behavior normal           I spent 8 minutes directly with the patient during this visit      VIRTUAL VISIT DISCLAIMER    Lesia Mc acknowledges that he has consented to an online visit or consultation  He understands that the online visit is based solely on information provided by him, and that, in the absence of a face-to-face physical evaluation by the physician, the diagnosis he receives is both limited and provisional in terms of accuracy and completeness  This is not intended to replace a full medical face-to-face evaluation by the physician  Lesia Mc understands and accepts these terms

## 2020-09-19 ENCOUNTER — APPOINTMENT (OUTPATIENT)
Dept: LAB | Facility: HOSPITAL | Age: 35
End: 2020-09-19
Attending: FAMILY MEDICINE
Payer: COMMERCIAL

## 2020-09-19 DIAGNOSIS — J45.40 MODERATE PERSISTENT ASTHMA WITHOUT COMPLICATION: ICD-10-CM

## 2020-09-19 DIAGNOSIS — E66.01 CLASS 3 SEVERE OBESITY DUE TO EXCESS CALORIES WITHOUT SERIOUS COMORBIDITY WITH BODY MASS INDEX (BMI) OF 40.0 TO 44.9 IN ADULT (HCC): ICD-10-CM

## 2020-09-19 LAB
ALBUMIN SERPL BCP-MCNC: 3.8 G/DL (ref 3.5–5)
ALP SERPL-CCNC: 75 U/L (ref 46–116)
ALT SERPL W P-5'-P-CCNC: 65 U/L (ref 12–78)
ANION GAP SERPL CALCULATED.3IONS-SCNC: 6 MMOL/L (ref 4–13)
AST SERPL W P-5'-P-CCNC: 27 U/L (ref 5–45)
BASOPHILS # BLD AUTO: 0.08 THOUSANDS/ΜL (ref 0–0.1)
BASOPHILS NFR BLD AUTO: 1 % (ref 0–1)
BILIRUB SERPL-MCNC: 0.9 MG/DL (ref 0.2–1)
BUN SERPL-MCNC: 12 MG/DL (ref 5–25)
CALCIUM SERPL-MCNC: 8.9 MG/DL (ref 8.3–10.1)
CHLORIDE SERPL-SCNC: 105 MMOL/L (ref 100–108)
CHOLEST SERPL-MCNC: 186 MG/DL (ref 50–200)
CO2 SERPL-SCNC: 28 MMOL/L (ref 21–32)
CREAT SERPL-MCNC: 0.92 MG/DL (ref 0.6–1.3)
EOSINOPHIL # BLD AUTO: 0.22 THOUSAND/ΜL (ref 0–0.61)
EOSINOPHIL NFR BLD AUTO: 3 % (ref 0–6)
ERYTHROCYTE [DISTWIDTH] IN BLOOD BY AUTOMATED COUNT: 13.2 % (ref 11.6–15.1)
GFR SERPL CREATININE-BSD FRML MDRD: 106 ML/MIN/1.73SQ M
GLUCOSE P FAST SERPL-MCNC: 105 MG/DL (ref 65–99)
HCT VFR BLD AUTO: 49.2 % (ref 36.5–49.3)
HDLC SERPL-MCNC: 50 MG/DL
HGB BLD-MCNC: 16 G/DL (ref 12–17)
IMM GRANULOCYTES # BLD AUTO: 0.03 THOUSAND/UL (ref 0–0.2)
IMM GRANULOCYTES NFR BLD AUTO: 0 % (ref 0–2)
LDLC SERPL CALC-MCNC: 95 MG/DL (ref 0–100)
LYMPHOCYTES # BLD AUTO: 1.96 THOUSANDS/ΜL (ref 0.6–4.47)
LYMPHOCYTES NFR BLD AUTO: 29 % (ref 14–44)
MCH RBC QN AUTO: 29.6 PG (ref 26.8–34.3)
MCHC RBC AUTO-ENTMCNC: 32.5 G/DL (ref 31.4–37.4)
MCV RBC AUTO: 91 FL (ref 82–98)
MONOCYTES # BLD AUTO: 0.74 THOUSAND/ΜL (ref 0.17–1.22)
MONOCYTES NFR BLD AUTO: 11 % (ref 4–12)
NEUTROPHILS # BLD AUTO: 3.8 THOUSANDS/ΜL (ref 1.85–7.62)
NEUTS SEG NFR BLD AUTO: 56 % (ref 43–75)
NONHDLC SERPL-MCNC: 136 MG/DL
NRBC BLD AUTO-RTO: 0 /100 WBCS
PLATELET # BLD AUTO: 312 THOUSANDS/UL (ref 149–390)
PMV BLD AUTO: 10.3 FL (ref 8.9–12.7)
POTASSIUM SERPL-SCNC: 4.1 MMOL/L (ref 3.5–5.3)
PROT SERPL-MCNC: 7.2 G/DL (ref 6.4–8.2)
RBC # BLD AUTO: 5.41 MILLION/UL (ref 3.88–5.62)
SODIUM SERPL-SCNC: 139 MMOL/L (ref 136–145)
TRIGL SERPL-MCNC: 205 MG/DL
WBC # BLD AUTO: 6.83 THOUSAND/UL (ref 4.31–10.16)

## 2020-09-19 PROCEDURE — 85025 COMPLETE CBC W/AUTO DIFF WBC: CPT

## 2020-09-19 PROCEDURE — 80053 COMPREHEN METABOLIC PANEL: CPT

## 2020-09-19 PROCEDURE — 80061 LIPID PANEL: CPT

## 2020-09-19 PROCEDURE — 36415 COLL VENOUS BLD VENIPUNCTURE: CPT

## 2020-09-22 ENCOUNTER — TELEPHONE (OUTPATIENT)
Dept: FAMILY MEDICINE CLINIC | Facility: CLINIC | Age: 35
End: 2020-09-22

## 2020-09-22 NOTE — TELEPHONE ENCOUNTER
----- Message from Ilda White DO sent at 9/22/2020  1:42 PM EDT -----  Reviewed  Patient should take fish oil to lower cholesterol and continue to avoid high cholesterol/fat foods       Ilda White DO  [facility]  9/22/2020 1:42 PM

## 2021-01-23 ENCOUNTER — TELEMEDICINE (OUTPATIENT)
Dept: FAMILY MEDICINE CLINIC | Facility: CLINIC | Age: 36
End: 2021-01-23
Payer: COMMERCIAL

## 2021-01-23 VITALS — TEMPERATURE: 98 F

## 2021-01-23 DIAGNOSIS — Z20.822 EXPOSURE TO COVID-19 VIRUS: Primary | ICD-10-CM

## 2021-01-23 DIAGNOSIS — J45.40 MODERATE PERSISTENT ASTHMA WITHOUT COMPLICATION: ICD-10-CM

## 2021-01-23 DIAGNOSIS — B34.9 VIRAL INFECTION, UNSPECIFIED: ICD-10-CM

## 2021-01-23 PROCEDURE — 99214 OFFICE O/P EST MOD 30 MIN: CPT | Performed by: STUDENT IN AN ORGANIZED HEALTH CARE EDUCATION/TRAINING PROGRAM

## 2021-01-23 PROCEDURE — U0005 INFEC AGEN DETEC AMPLI PROBE: HCPCS | Performed by: STUDENT IN AN ORGANIZED HEALTH CARE EDUCATION/TRAINING PROGRAM

## 2021-01-23 PROCEDURE — U0003 INFECTIOUS AGENT DETECTION BY NUCLEIC ACID (DNA OR RNA); SEVERE ACUTE RESPIRATORY SYNDROME CORONAVIRUS 2 (SARS-COV-2) (CORONAVIRUS DISEASE [COVID-19]), AMPLIFIED PROBE TECHNIQUE, MAKING USE OF HIGH THROUGHPUT TECHNOLOGIES AS DESCRIBED BY CMS-2020-01-R: HCPCS | Performed by: STUDENT IN AN ORGANIZED HEALTH CARE EDUCATION/TRAINING PROGRAM

## 2021-01-23 RX ORDER — ALBUTEROL SULFATE 90 UG/1
1 AEROSOL, METERED RESPIRATORY (INHALATION) EVERY 4 HOURS PRN
Qty: 8.5 INHALER | Refills: 3 | Status: SHIPPED | OUTPATIENT
Start: 2021-01-23

## 2021-01-23 RX ORDER — BUDESONIDE AND FORMOTEROL FUMARATE DIHYDRATE 160; 4.5 UG/1; UG/1
2 AEROSOL RESPIRATORY (INHALATION) 2 TIMES DAILY
Qty: 1 INHALER | Refills: 5 | Status: SHIPPED | OUTPATIENT
Start: 2021-01-23

## 2021-01-23 NOTE — PROGRESS NOTES
COVID-19 Virtual Visit     Assessment/Plan:    Problem List Items Addressed This Visit        Respiratory    Asthma    Relevant Medications    albuterol (PROVENTIL HFA,VENTOLIN HFA) 90 mcg/act inhaler    budesonide-formoterol (Symbicort) 160-4 5 mcg/act inhaler    ipratropium (ATROVENT) 0 02 % nebulizer solution      Other Visit Diagnoses     Exposure to COVID-19 virus        Viral infection, unspecified             Disposition:     I recommended the patient to come to our office to perform PCR testing for COVID-19  I have spent 5 minutes directly with the patient  Greater than 50% of this time was spent in counseling/coordination of care regarding: risks and benefits of treatment options, instructions for management, risk factor reductions and impressions  Encounter provider Greg Chilel MD    Provider located at [address]  [place] [phone]    Recent Visits  No visits were found meeting these conditions  Showing recent visits within past 7 days and meeting all other requirements     Today's Visits  Date Type Provider Dept   01/23/21 Telemedicine Greg Chilel MD [facility] today's visits and meeting all other requirements     Future Appointments  No visits were found meeting these conditions  Showing future appointments within next 150 days and meeting all other requirements      This virtual check-in was done via [employer] and patient was informed that this is not a secure, HIPAA-compliant platform  He agrees to proceed  Patient agrees to participate in a virtual check in via telephone or video visit instead of presenting to the office to address urgent/immediate medical needs  Patient is aware this is a billable service  After connecting through [facility], the patient was identified by name and date of birth   Beryl Roberts xena informed that this was a telemedicine visit and that the exam was being conducted confidentially over secure lines  My office door was closed  No one else was in the room  Molly Ann acknowledged consent and understanding of privacy and security of the telemedicine visit  I informed the patient that I have reviewed his record in Epic and presented the opportunity for him to ask any questions regarding the visit today  The patient agreed to participate  Subjective:   Molly Ann is a 38 y o  male who is concerned about COVID-19  Patient's symptoms include chills, fatigue, malaise, nasal congestion, rhinorrhea, sore throat, cough, chest tightness, abdominal pain, diarrhea, myalgias and headache  Patient denies fever, anosmia, loss of taste, shortness of breath, nausea and vomiting       Date of symptom onset: 1/20/2021  Date of exposure: 1/18/2021    Exposure:   Contact with a person who is under investigation (PUI) for or who is positive for COVID-19 within the last 14 days?: Yes    Hospitalized recently for fever and/or lower respiratory symptoms?: No      Currently a healthcare worker that is involved in direct patient care?: No      Works in a special setting where the risk of COVID-19 transmission may be high? (this may include long-term care, correctional and [facility] facilities; homeless shelters; assisted-living facilities and group homes ): No      Resident in a special setting where the risk of COVID-19 transmission may be high? (this may include long-term care, correctional and [facility] facilities; homeless shelters; assisted-living facilities and group homes ): No      No results found for: Jean-Paul Simons, [address] [facility], [address] [place] [address],[address] [address]  Past Medical History:   Diagnosis Date    Asthma      Past Surgical History:   Procedure Laterality Date    HERNIA REPAIR       Current Outpatient Medications   Medication Sig Dispense Refill    albuterol (PROVENTIL HFA,VENTOLIN HFA) 90 mcg/act inhaler Inhale 1 puff every 4 (four) hours as needed for wheezing 8 5 Inhaler 3    budesonide-formoterol (Symbicort) 160-4 5 mcg/act inhaler Inhale 2 puffs 2 (two) times a day 1 Inhaler 5    ipratropium (ATROVENT) 0 02 % nebulizer solution Take 1 vial (0 5 mg total) by nebulization 4 (four) times a day for 10 days 100 mL 0    triamcinolone (KENALOG) 0 5 % cream Apply topically 2 (two) times a day for 10 days (Patient not taking: Reported on 1/23/2021) 15 g 0     No current facility-administered medications for this visit  Allergies   Allergen Reactions    Shellfish-Derived Products Anaphylaxis       Review of Systems   Constitutional: Positive for chills and fatigue  Negative for fever  HENT: Positive for congestion, rhinorrhea and sore throat  Respiratory: Positive for cough and chest tightness  Negative for shortness of breath  Gastrointestinal: Positive for abdominal pain and diarrhea  Negative for nausea and vomiting  Musculoskeletal: Positive for myalgias  Neurological: Positive for headaches  Objective:    Vitals:    01/23/21 0836   Temp: 98 °F (36 7 °C)       Physical Exam  Constitutional:       Appearance: Normal appearance  HENT:      Head: Normocephalic and atraumatic  Right Ear: External ear normal       Left Ear: External ear normal       Nose: Congestion and rhinorrhea present  Neck:      Musculoskeletal: Normal range of motion  Pulmonary:      Effort: Pulmonary effort is normal  No respiratory distress  Breath sounds: No wheezing  Neurological:      Mental Status: He is alert  Psychiatric:         Mood and Affect: Mood normal          Thought Content: Thought content normal        VIRTUAL VISIT DISCLAIMER    Kitty Hawthorne acknowledges that he has consented to an online visit or consultation   He understands that the online visit is based solely on information provided by him, and that, in the absence of a face-to-face physical evaluation by the physician, the diagnosis he receives is both limited and provisional in terms of accuracy and completeness  This is not intended to replace a full medical face-to-face evaluation by the physician  Marielos Schneider understands and accepts these terms

## 2021-01-24 LAB — SARS-COV-2 N GENE RESP QL NAA+PROBE: NEGATIVE

## 2021-01-25 NOTE — RESULT ENCOUNTER NOTE
I called Cristy Mitchell and let him know that his COVID-19 swab was negative  I advised him to continue social distancing procedures

## 2021-03-30 DIAGNOSIS — Z23 ENCOUNTER FOR IMMUNIZATION: ICD-10-CM

## 2021-04-02 ENCOUNTER — HOSPITAL ENCOUNTER (OUTPATIENT)
Dept: RADIOLOGY | Facility: HOSPITAL | Age: 36
Discharge: HOME | End: 2021-04-02
Attending: FAMILY MEDICINE
Payer: COMMERCIAL

## 2021-04-02 ENCOUNTER — OFFICE VISIT (OUTPATIENT)
Dept: FAMILY MEDICINE CLINIC | Facility: CLINIC | Age: 36
End: 2021-04-02

## 2021-04-02 VITALS
SYSTOLIC BLOOD PRESSURE: 112 MMHG | WEIGHT: 309 LBS | DIASTOLIC BLOOD PRESSURE: 70 MMHG | TEMPERATURE: 99 F | OXYGEN SATURATION: 97 % | HEIGHT: 72 IN | HEART RATE: 92 BPM | BODY MASS INDEX: 41.85 KG/M2

## 2021-04-02 DIAGNOSIS — S99.911A ANKLE INJURY, RIGHT, INITIAL ENCOUNTER: Primary | ICD-10-CM

## 2021-04-02 DIAGNOSIS — S99.911A ANKLE INJURY, RIGHT, INITIAL ENCOUNTER: ICD-10-CM

## 2021-04-02 PROCEDURE — 73610 X-RAY EXAM OF ANKLE: CPT

## 2021-04-02 PROCEDURE — 99213 OFFICE O/P EST LOW 20 MIN: CPT | Performed by: FAMILY MEDICINE

## 2021-04-02 RX ORDER — MELOXICAM 15 MG/1
15 TABLET ORAL DAILY
Qty: 30 TABLET | Refills: 2 | Status: SHIPPED | OUTPATIENT
Start: 2021-04-02

## 2021-04-02 NOTE — PROGRESS NOTES
Assessment/Plan:    No problem-specific Assessment & Plan notes found for this encounter  Diagnoses and all orders for this visit:    Ankle injury, right, initial encounter  -     meloxicam (MOBIC) 15 mg tablet; Take 1 tablet (15 mg total) by mouth daily  -     XR ankle 3+ vw left; Future        Due to inability to bear weight and point tenderness over the lateral malleolus, will XR to rule out fracture  Discussed RICE and bracing with limited weight bearing if fracture is ruled out  Subjective:      Patient ID: Jose Ramon Walls is a 38 y o  male  HPI  Patient presents to the office for evaluation of an ankle injury  Notes that he stepped down off a step and rolled (Inverted) his ankle this afternoon  [place] a park  He took Aleve and has been icing the ankle since then  Notes that he was not able to stand and put pressure on the ankle immediately  At this point, he is able to put very minimal weight on his ankle  Notes swelling and pain over the lateral malleolus  Denies numbness or tingling  Denies any other injury  Did not hit head  The following portions of the patient's history were reviewed and updated as appropriate: allergies, current medications, past family history, past medical history, past social history, past surgical history and problem list     Review of Systems   Constitutional: Negative for fatigue  Cardiovascular: Positive for leg swelling (ankle)  Musculoskeletal: Positive for gait problem (ankle injury) and joint swelling  Negative for arthralgias  Skin: Negative for wound  Neurological: Negative for weakness and numbness  Objective:    /70 (BP Location: Left arm, Patient Position: Sitting, Cuff Size: Large)   Pulse 92   Temp 99 °F (37 2 °C)   Ht 5' 11 5" (1 816 m)   Wt (!) 140 kg (309 lb)   SpO2 97%   BMI 42 50 kg/m²      Physical Exam  Vitals signs reviewed  Constitutional:       General: He is not in acute distress       Appearance: Normal appearance  He is obese  HENT:      Head: Normocephalic and atraumatic  Right Ear: External ear normal       Left Ear: External ear normal       Nose: Nose normal       Mouth/Throat:      Mouth: Mucous membranes are moist    Eyes:      Extraocular Movements: Extraocular movements intact  Conjunctiva/sclera: Conjunctivae normal    Cardiovascular:      Rate and Rhythm: Normal rate and regular rhythm  Heart sounds: Normal heart sounds  Pulmonary:      Effort: Pulmonary effort is normal       Breath sounds: Normal breath sounds  Musculoskeletal:         General: Swelling, tenderness and signs of injury present  Right lower leg: No edema  Comments: Swelling and tenderness over the left lateral malleolus  No bruising  Limited ROM, pain with inversion and eversion  No pain at fibular head  Skin:     General: Skin is warm  Capillary Refill: Capillary refill takes less than 2 seconds  Neurological:      Mental Status: He is alert  Mental status is at baseline             Tyson Linn DO  [facility]  4/2/2021 4:38 PM

## 2021-04-13 ENCOUNTER — IMMUNIZATIONS (OUTPATIENT)
Dept: FAMILY MEDICINE CLINIC | Facility: HOSPITAL | Age: 36
End: 2021-04-13

## 2021-04-13 DIAGNOSIS — Z23 ENCOUNTER FOR IMMUNIZATION: Primary | ICD-10-CM

## 2021-04-13 PROCEDURE — 0001A SARS-COV-2 / COVID-19 MRNA VACCINE (PFIZER-BIONTECH) 30 MCG: CPT

## 2021-04-13 PROCEDURE — 91300 SARS-COV-2 / COVID-19 MRNA VACCINE (PFIZER-BIONTECH) 30 MCG: CPT

## 2021-05-04 ENCOUNTER — IMMUNIZATIONS (OUTPATIENT)
Dept: FAMILY MEDICINE CLINIC | Facility: HOSPITAL | Age: 36
End: 2021-05-04

## 2021-05-04 DIAGNOSIS — Z23 ENCOUNTER FOR IMMUNIZATION: Primary | ICD-10-CM

## 2021-05-04 PROCEDURE — 91300 SARS-COV-2 / COVID-19 MRNA VACCINE (PFIZER-BIONTECH) 30 MCG: CPT

## 2021-05-04 PROCEDURE — 0002A SARS-COV-2 / COVID-19 MRNA VACCINE (PFIZER-BIONTECH) 30 MCG: CPT

## 2021-12-03 ENCOUNTER — IMMUNIZATIONS (OUTPATIENT)
Dept: FAMILY MEDICINE CLINIC | Facility: HOSPITAL | Age: 36
End: 2021-12-03

## 2021-12-03 DIAGNOSIS — Z23 ENCOUNTER FOR IMMUNIZATION: Primary | ICD-10-CM

## 2021-12-03 PROCEDURE — 91300 COVID-19 PFIZER VACC 0.3 ML: CPT

## 2021-12-03 PROCEDURE — 0001A COVID-19 PFIZER VACC 0.3 ML: CPT

## 2023-12-19 ENCOUNTER — OFFICE VISIT (OUTPATIENT)
Age: 38
End: 2023-12-19
Payer: COMMERCIAL

## 2023-12-19 VITALS
DIASTOLIC BLOOD PRESSURE: 76 MMHG | RESPIRATION RATE: 18 BRPM | OXYGEN SATURATION: 96 % | SYSTOLIC BLOOD PRESSURE: 138 MMHG | HEART RATE: 96 BPM | BODY MASS INDEX: 42.91 KG/M2 | WEIGHT: 312 LBS | TEMPERATURE: 97.7 F

## 2023-12-19 DIAGNOSIS — U07.1 COVID-19: Primary | ICD-10-CM

## 2023-12-19 DIAGNOSIS — R05.1 ACUTE COUGH: ICD-10-CM

## 2023-12-19 LAB
SARS-COV-2 AG UPPER RESP QL IA: POSITIVE
VALID CONTROL: ABNORMAL

## 2023-12-19 PROCEDURE — G0382 LEV 3 HOSP TYPE B ED VISIT: HCPCS | Performed by: EMERGENCY MEDICINE

## 2023-12-19 PROCEDURE — 87811 SARS-COV-2 COVID19 W/OPTIC: CPT | Performed by: EMERGENCY MEDICINE

## 2023-12-19 RX ORDER — ALBUTEROL SULFATE 90 UG/1
2 AEROSOL, METERED RESPIRATORY (INHALATION) 4 TIMES DAILY
Qty: 8 G | Refills: 0 | Status: SHIPPED | OUTPATIENT
Start: 2023-12-19

## 2023-12-19 RX ORDER — BUDESONIDE 90 UG/1
1 AEROSOL, POWDER RESPIRATORY (INHALATION) 2 TIMES DAILY
Qty: 1 EACH | Refills: 0 | Status: SHIPPED | OUTPATIENT
Start: 2023-12-19

## 2023-12-19 NOTE — PATIENT INSTRUCTIONS
After Visit Summary   10/31/2018    Celeste Chacko    MRN: [id number]           Patient Information     Date Of Birth          5/11/1933        Visit Information        Provider Department      10/31/2018 4:00 PM Shirley Ann PT [facility]        Today's Diagnoses     Lumbar radiculopathy    -  1       Follow-ups after your visit        Your next 10 appointments already scheduled     Nov 06, 2018  2:40 PM CST   [facility] Spine with Shirley Ann PT   [facility] ([facility]  )    [address] [id number] [phone]            Jan 02, 2019  1:00 PM CST   Return Visit with Alicia Neal PA-C   [facility] ([facility])    [address] [place] [id number] [phone]              Who to contact     If you have questions or need follow up information about today's clinic visit or your schedule please contact [facility]ER[facility] directly at [phone].  Normal or non-critical lab and imaging results will be communicated to you by MyChart, letter or phone within 4 business days after the clinic has received the results. If you do not hear from us within 7 days, please contact the clinic through MyChart or phone. If you have a critical or abnormal lab result, we will notify you by phone as soon as possible.  Submit refill requests through [employer] or call your pharmacy and they will forward the refill request to us. Please allow 3 business days for your refill to be completed.          Additional Information About Your Visit        Care EveryWhere ID     This is your Care EveryWhere ID. This could be used by other organizations to access your [place] medical records  [id number]         Blood Pressure from Last 3 Encounters:   10/30/18 148/76   10/27/18 148/72   Meds as instructed  Hydration and rest.  quarantine 5 days from the onset of symptoms, and fever free for 24 hrs  Wear your mask for 5 days after quarantine and wash hands often.  PCP follow up in 3-5 days; call them first  Go to an emergency department if difficulty breathing occurs.    Recommended supplements for potential COVID-19 is the following: Vitamin D3 2000 IU  daily ,  Vitamin C 1g  every 12 hours , Multivitamin Daily       COVID-19 Home Care Guidelines    Your healthcare provider and/or public health staff have evaluated you and have determined that you do not need to remain in the hospital at this time.  At this time you can be isolated at home where you will be monitored by staff from your local or state health department. You should carefully follow the prevention and isolation steps below until a healthcare provider or local or state health department says that you can return to your normal activities.      Stay home except to get medical care    People who are mildly ill with COVID-19 are able to isolate at home during their illness. You should restrict activities outside your home, except for getting medical care. Do not go to work, school, or public areas. Avoid using public transportation, ride-sharing, or taxis.    Separate yourself from other people and animals in your home    People: As much as possible, you should stay in a specific room and away from other people in your home. Also, you should use a separate bathroom, if available.  Animals: You should restrict contact with pets and other animals while you are sick with COVID-19, just like you would around other people. Although there have not been reports of pets or other animals becoming sick with COVID-19, it is still recommended that people sick with COVID-19 limit contact with animals until more information is known about the virus. When possible, have another member of your household care for your animals while you are sick. If you    10/26/18 140/60    Weight from Last 3 Encounters:   10/30/18 64.1 kg (141 lb 4.8 oz)   10/27/18 64 kg (141 lb)   10/26/18 64.4 kg (141 lb 14.4 oz)              We Performed the Following     [facility] Inital Eval Report     PT Eval, Low Complexity (71864)     Therapeutic Exercises        Primary Care Provider Office Phone # Fax #    Emily Marie [phone]       [address]   [facility] [address]        Equal Access to Services     [facility]: Hadii aad ku hadasho Soomaali, waaxda luqadaha, qaybta kaalmada adeegyada, waxay shayin haybinta ademiller chao . So [facility] [phone].    ATENCIÓN: Si habla español, tiene a harvey disposición servicios gratuitos de asistencia lingüística. Ll[facility] [phone].    We comply with applicable federal civil rights laws and [place] laws. We do not discriminate on the basis of race, color, national origin, age, disability, sex, sexual orientation, or gender identity.            Thank you!     Thank you for choosing [facility]  for your care. Our goal is always to provide you with excellent care. Hearing back from our patients is one way we can continue to improve our services. Please take a few minutes to complete the written survey that you may receive in the mail after your visit with us. Thank you!             Your Updated Medication List - Protect others around you: Learn how to safely use, store and throw away your medicines at www.disposemymeds.org.          This list is accurate as of 10/31/18  8:28 PM.  Always use your most recent med list.                   Brand Name Dispense Instructions for use Diagnosis    alendronate 70 MG tablet    FOSAMAX    4 tablet    TAKE 1 TABLET (70 MG) BY MOUTH EVERY 7 DAYS    Osteopenia, unspecified location       amLODIPine 2.5 MG tablet    NORVASC    90 tablet    Take 1 tablet (2.5 mg) by mouth daily        amoxicillin 500 MG capsule    AMOXIL     TAKE FOUR  are sick with COVID-19, avoid contact with your pet, including petting, snuggling, being kissed or licked, and sharing food. If you must care for your pet or be around animals while you are sick, wash your hands before and after you interact with pets and wear a facemask. See COVID-19 and Animals for more information.    Call ahead before visiting your doctor    If you have a medical appointment, call the healthcare provider and tell them that you have or may have COVID-19. This will help the healthcare provider’s office take steps to keep other people from getting infected or exposed.    Wear a facemask    You should wear a facemask when you are around other people (e.g., sharing a room or vehicle) or pets and before you enter a healthcare provider’s office. If you are not able to wear a facemask (for example, because it causes trouble breathing), then people who live with you should not stay in the same room with you, or they should wear a facemask if they enter your room.    Cover your coughs and sneezes    Cover your mouth and nose with a tissue when you cough or sneeze. Throw used tissues in a lined trash can. Immediately wash your hands with soap and water for at least 20 seconds or, if soap and water are not available, clean your hands with an alcohol-based hand  that contains at least 60% alcohol.    Clean your hands often    Wash your hands often with soap and water for at least 20 seconds, especially after blowing your nose, coughing, or sneezing; going to the bathroom; and before eating or preparing food. If soap and water are not readily available, use an alcohol-based hand  with at least 60% alcohol, covering all surfaces of your hands and rubbing them together until they feel dry.  Soap and water are the best option if hands are visibly dirty. Avoid touching your eyes, nose, and mouth with unwashed hands.    Avoid sharing personal household items    You should not share dishes,  CAPSULES (2 GRAMS) BY MOUTH ONE HOUR BEFORE DENTAL APPOINTMENT        ascorbic acid 500 MG tablet    VITAMIN C    30 tablet    Take 1 tablet (500 mg) by mouth daily        ASPIRIN EC PO      Take 81 mg by mouth daily        calcium carbonate 600 mg-vitamin D 400 units 600-400 MG-UNIT per tablet    CALTRATE     Take 1 tablet by mouth daily 1200mg  1000 mg of vitamin d        FIBERCON PO      Take 1 tablet by mouth once , one in the morning and one in the evening        fluticasone-salmeterol 500-50 MCG/DOSE diskus inhaler    ADVAIR    3 Inhaler    Inhale 1 puff into the lungs 2 times daily    Chronic obstructive pulmonary disease, unspecified COPD type (H)       glipiZIDE 5 MG tablet    GLUCOTROL    90 tablet    Take 1 tablet (5 mg) by mouth every morning (before breakfast)    Type 2 diabetes mellitus without complication, without long-term current use of insulin (H)       Glucosamine-Chondroitin 500-250 MG Caps    GLUCOSAMINE CHONDROITIN COMPLX     Take 1 tablet by mouth 2 times daily    Generalized osteoarthrosis, unspecified site       lidocaine 5 % Patch    LIDODERM    10 patch    Place 1 patch onto the skin every 24 hours for 10 days        lisinopril 40 MG tablet    PRINIVIL/ZESTRIL    90 tablet    Take 1 tablet (40 mg) by mouth daily    Benign essential hypertension       MULTIVITAMIN ADULT PO      Take 1 tablet by mouth daily        NONFORMULARY      Natra sleeping med takes 1 at night.        ONETOUCH DELICA LANCETS 33G Misc      Reported on 4/12/2017        ONETOUCH ULTRA test strip   Generic drug:  blood glucose monitoring      Reported on 4/12/2017        oxyCODONE IR 5 MG tablet    ROXICODONE    150 tablet    May take 5 tabs per day    Spinal stenosis of lumbar region with neurogenic claudication       pravastatin 80 MG tablet    PRAVACHOL    90 tablet    Take 1 tablet (80 mg) by mouth daily    Hyperlipidemia LDL goal <100       PROAIR  (90 Base) MCG/ACT inhaler   Generic drug:  albuterol     8.5  drinking glasses, cups, eating utensils, towels, or bedding with other people or pets in your home. After using these items, they should be washed thoroughly with soap and water.    Clean all “high-touch” surfaces everyday    High touch surfaces include counters, tabletops, doorknobs, bathroom fixtures, toilets, phones, keyboards, tablets, and bedside tables. Also, clean any surfaces that may have blood, stool, or body fluids on them. Use a household cleaning spray or wipe, according to the label instructions. Labels contain instructions for safe and effective use of the cleaning product including precautions you should take when applying the product, such as wearing gloves and making sure you have good ventilation during use of the product.    Monitor your symptoms    Seek prompt medical attention if your illness is worsening (e.g., difficulty breathing). Before seeking care, call your healthcare provider and tell them that you have, or are being evaluated for, COVID-19. Put on a facemask before you enter the facility. These steps will help the healthcare provider’s office to keep other people in the office or waiting room from getting infected or exposed. Ask your healthcare provider to call the local or state health department. Persons who are placed under active monitoring or facilitated self-monitoring should follow instructions provided by their local health department or occupational health professionals, as appropriate.  If you have a medical emergency and need to call 911, notify the dispatch personnel that you have, or are being evaluated for COVID-19. If possible, put on a facemask before emergency medical services arrive.    Discontinuing home isolation    Patients with confirmed COVID-19 should remain under home isolation precautions until the following conditions are met:   They have had no fever for at least 24 hours (that is one full day of no fever without the use medicine that reduces  Inhaler    INHALE 2 PUFFS INTO LUNGS EVERY 6HRS AS NEEDED FOR SHORTNESS OF BREATH/DYSPNEA/WHEEZING    Chronic obstructive pulmonary disease, unspecified COPD type (H)       senna-docusate 8.6-50 MG per tablet    SENOKOT-S;PERICOLACE     Take 1 tablet by mouth 2 times daily Reported on 4/12/2017        SPIRIVA HANDIHALER 18 MCG capsule   Generic drug:  tiotropium     90 capsule    INHALE CONTENTS OF ONE CAPSULE DAILY.    Chronic obstructive pulmonary disease, unspecified COPD type (H)       TYLENOL PO      Take 325 mg by mouth 6 times daily Patient takes TID with Oxycodone.        VITAMIN E PO      Take 400 Int'l Units by mouth daily           fevers)  AND  other symptoms have improved (for example, when their cough or shortness of breath have improved)  AND  If had mild or moderate illness, at least 10 days have passed since their symptoms first appeared or if severe illness (needed oxygen) or immunosuppressed, at least 20 days have passed since symptoms first appeared  Patients with confirmed COVID-19 should also notify close contacts (including their workplace) and ask that they self-quarantine. Currently, close contact is defined as being within 6 feet for 15 minutes or more from the period 24 hours starting 48 hours before symptom onset to the time at which the patient went into isolation.  Close contacts of patients diagnosed with COVID-19 should be instructed by the patient to self-quarantine for 14 days from the last time of their last contact with the patient.     Source: https://www.cdc.gov/coronavirus/2019-ncov/hcp/guidance-prevent-spread.html

## 2023-12-19 NOTE — PROGRESS NOTES
[facility]        NAME: Mo Collier is a 38 y.o. male  : 1985    MRN: [id number]  DATE: 2023  TIME: 5:14 PM    Assessment and Plan   COVID-19 [U07.1]  1. COVID-19  budesonide (Pulmicort Flexhaler) 90 MCG/ACT inhaler    albuterol (PROVENTIL HFA,VENTOLIN HFA) 90 mcg/act inhaler      2. Acute cough  budesonide (Pulmicort Flexhaler) 90 MCG/ACT inhaler    albuterol (PROVENTIL HFA,VENTOLIN HFA) 90 mcg/act inhaler    Poct Covid 19 Rapid Antigen Test        Rapid COVID was positive    Patient Instructions     Patient Instructions    Meds as instructed  Hydration and rest.  quarantine 5 days from the onset of symptoms, and fever free for 24 hrs  Wear your mask for 5 days after quarantine and wash hands often.  PCP follow up in 3-5 days; call them first  Go to an emergency department if difficulty breathing occurs.    Recommended supplements for potential COVID-19 is the following: Vitamin D3 2000 IU  daily ,  Vitamin C 1g  every 12 hours , Multivitamin Daily       COVID-19 Home Care Guidelines    Your healthcare provider and/or public health staff have evaluated you and have determined that you do not need to remain in the hospital at this time.  At this time you can be isolated at home where you will be monitored by staff from your local or state health department. You should carefully follow the prevention and isolation steps below until a healthcare provider or local or state health department says that you can return to your normal activities.      Stay home except to get medical care    People who are mildly ill with COVID-19 are able to isolate at home during their illness. You should restrict activities outside your home, except for getting medical care. Do not go to work, school, or public areas. Avoid using public transportation, ride-sharing, or taxis.    Separate yourself from other people and animals in your home    People: As much as possible, you should stay in a specific room  and away from other people in your home. Also, you should use a separate bathroom, if available.  Animals: You should restrict contact with pets and other animals while you are sick with COVID-19, just like you would around other people. Although there have not been reports of pets or other animals becoming sick with COVID-19, it is still recommended that people sick with COVID-19 limit contact with animals until more information is known about the virus. When possible, have another member of your household care for your animals while you are sick. If you are sick with COVID-19, avoid contact with your pet, including petting, snuggling, being kissed or licked, and sharing food. If you must care for your pet or be around animals while you are sick, wash your hands before and after you interact with pets and wear a facemask. See COVID-19 and Animals for more information.    Call ahead before visiting your doctor    If you have a medical appointment, call the healthcare provider and tell them that you have or may have COVID-19. This will help the healthcare provider’s office take steps to keep other people from getting infected or exposed.    Wear a facemask    You should wear a facemask when you are around other people (e.g., sharing a room or vehicle) or pets and before you enter a healthcare provider’s office. If you are not able to wear a facemask (for example, because it causes trouble breathing), then people who live with you should not stay in the same room with you, or they should wear a facemask if they enter your room.    Cover your coughs and sneezes    Cover your mouth and nose with a tissue when you cough or sneeze. Throw used tissues in a lined trash can. Immediately wash your hands with soap and water for at least 20 seconds or, if soap and water are not available, clean your hands with an alcohol-based hand  that contains at least 60% alcohol.    Clean your hands often    Wash your hands often  with soap and water for at least 20 seconds, especially after blowing your nose, coughing, or sneezing; going to the bathroom; and before eating or preparing food. If soap and water are not readily available, use an alcohol-based hand  with at least 60% alcohol, covering all surfaces of your hands and rubbing them together until they feel dry.  Soap and water are the best option if hands are visibly dirty. Avoid touching your eyes, nose, and mouth with unwashed hands.    Avoid sharing personal household items    You should not share dishes, drinking glasses, cups, eating utensils, towels, or bedding with other people or pets in your home. After using these items, they should be washed thoroughly with soap and water.    Clean all “high-touch” surfaces everyday    High touch surfaces include counters, tabletops, doorknobs, bathroom fixtures, toilets, phones, keyboards, tablets, and bedside tables. Also, clean any surfaces that may have blood, stool, or body fluids on them. Use a household cleaning spray or wipe, according to the label instructions. Labels contain instructions for safe and effective use of the cleaning product including precautions you should take when applying the product, such as wearing gloves and making sure you have good ventilation during use of the product.    Monitor your symptoms    Seek prompt medical attention if your illness is worsening (e.g., difficulty breathing). Before seeking care, call your healthcare provider and tell them that you have, or are being evaluated for, COVID-19. Put on a facemask before you enter the facility. These steps will help the healthcare provider’s office to keep other people in the office or waiting room from getting infected or exposed. Ask your healthcare provider to call the local or state health department. Persons who are placed under active monitoring or facilitated self-monitoring should follow instructions provided by their local health  department or occupational health professionals, as appropriate.  If you have a medical emergency and need to call 911, notify the dispatch personnel that you have, or are being evaluated for COVID-19. If possible, put on a facemask before emergency medical services arrive.    Discontinuing home isolation    Patients with confirmed COVID-19 should remain under home isolation precautions until the following conditions are met:   They have had no fever for at least 24 hours (that is one full day of no fever without the use medicine that reduces fevers)  AND  other symptoms have improved (for example, when their cough or shortness of breath have improved)  AND  If had mild or moderate illness, at least 10 days have passed since their symptoms first appeared or if severe illness (needed oxygen) or immunosuppressed, at least 20 days have passed since symptoms first appeared  Patients with confirmed COVID-19 should also notify close contacts (including their workplace) and ask that they self-quarantine. Currently, close contact is defined as being within 6 feet for 15 minutes or more from the period 24 hours starting 48 hours before symptom onset to the time at which the patient went into isolation.  Close contacts of patients diagnosed with COVID-19 should be instructed by the patient to self-quarantine for 14 days from the last time of their last contact with the patient.     Source: https://www.cdc.gov/coronavirus/2019-ncov/hcp/guidance-prevent-spread.html         Follow up with PCP in 3-5 days.  Proceed to  ER if symptoms worsen.    Chief Complaint     Chief Complaint   Patient presents with    Cold Like Symptoms     Pt states he has a cough, body aches, and a runny nose for 6 days          History of Present Illness       38-year-old male with a chief complaint of cough ,congestion, sore throat and right ear pain since last Tuesday.  Patient states tested positive for COVID at home but his test was .  Patient  is an asthmatic and has been using his inhaler occasionally.  Patient states he thinks he is through the worst of it.        Review of Systems   Review of Systems   Constitutional:  Positive for chills, fatigue and fever. Negative for diaphoresis.   HENT:  Positive for congestion and ear pain. Negative for nosebleeds and sore throat.    Eyes:  Negative for photophobia, pain, discharge and visual disturbance.   Respiratory:  Positive for cough. Negative for choking, chest tightness, shortness of breath and wheezing.    Cardiovascular:  Negative for chest pain and palpitations.   Gastrointestinal:  Negative for abdominal distention, abdominal pain, diarrhea and vomiting.   Genitourinary:  Negative for dysuria, flank pain and frequency.   Musculoskeletal:  Negative for back pain, gait problem and joint swelling.   Skin:  Negative for color change and rash.   Neurological:  Positive for headaches. Negative for dizziness and syncope.   Psychiatric/Behavioral:  Negative for behavioral problems and confusion. The patient is not nervous/anxious.    All other systems reviewed and are negative.        Current Medications       Current Outpatient Medications:     albuterol (PROVENTIL HFA,VENTOLIN HFA) 90 mcg/act inhaler, Inhale 1 puff every 4 (four) hours as needed for wheezing, Disp: 8.5 Inhaler, Rfl: 3    albuterol (PROVENTIL HFA,VENTOLIN HFA) 90 mcg/act inhaler, Inhale 2 puffs 4 (four) times a day, Disp: 8 g, Rfl: 0    budesonide (Pulmicort Flexhaler) 90 MCG/ACT inhaler, Inhale 1 puff 2 (two) times a day Rinse mouth after use., Disp: 1 each, Rfl: 0    budesonide-formoterol (Symbicort) 160-4.5 mcg/act inhaler, Inhale 2 puffs 2 (two) times a day, Disp: 1 Inhaler, Rfl: 5    ipratropium (ATROVENT) 0.02 % nebulizer solution, Take 1 vial (0.5 mg total) by nebulization 4 (four) times a day for 10 days, Disp: 100 mL, Rfl: 0    meloxicam (MOBIC) 15 mg tablet, Take 1 tablet (15 mg total) by mouth daily (Patient not taking: Reported  on 12/19/2023), Disp: 30 tablet, Rfl: 2    triamcinolone (KENALOG) 0.5 % cream, Apply topically 2 (two) times a day for 10 days (Patient not taking: Reported on 1/23/2021), Disp: 15 g, Rfl: 0    Current Allergies     Allergies as of 12/19/2023 - Reviewed 12/19/2023   Allergen Reaction Noted    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     Past Medical History:   Diagnosis Date    Asthma        Past Surgical History:   Procedure Laterality Date    HERNIA REPAIR         Family History   Problem Relation Age of Onset    No Known Problems Father     Diabetes Mother     Diabetes Maternal Grandmother          Medications have been verified.        Objective   /76   Pulse 96   Temp 97.7 °F (36.5 °C)   Resp 18   Wt (!) 142 kg (312 lb)   SpO2 96%   BMI 42.91 kg/m²        Physical Exam     Physical Exam  Vitals and nursing note reviewed.   Constitutional:       General: He is not in acute distress.     Appearance: Normal appearance. He is not ill-appearing, toxic-appearing or diaphoretic.      Comments: 38-year-old male sitting on the exam table with slight congestion.   HENT:      Head: Normocephalic and atraumatic.      Right Ear: Tympanic membrane normal.      Left Ear: Tympanic membrane normal.      Nose: Nose normal.      Mouth/Throat:      Mouth: Mucous membranes are moist.      Pharynx: Oropharynx is clear. No oropharyngeal exudate.   Eyes:      Extraocular Movements: Extraocular movements intact.      Pupils: Pupils are equal, round, and reactive to light.   Neck:      Vascular: No carotid bruit.   Cardiovascular:      Rate and Rhythm: Normal rate.      Pulses: Normal pulses.      Heart sounds: Normal heart sounds.   Pulmonary:      Effort: Pulmonary  effort is normal.      Breath sounds: Normal breath sounds.   Abdominal:      General: Abdomen is flat. Bowel sounds are normal. There is no distension.      Palpations: Abdomen is soft. There is no mass.      Tenderness: There is no abdominal tenderness. There is no right CVA tenderness, left CVA tenderness, guarding or rebound.      Hernia: No hernia is present.   Musculoskeletal:         General: No swelling, tenderness, deformity or signs of injury. Normal range of motion.      Cervical back: Normal range of motion and neck supple. No rigidity. No muscular tenderness.      Right lower leg: No edema.      Left lower leg: No edema.   Lymphadenopathy:      Cervical: No cervical adenopathy.   Skin:     General: Skin is warm and dry.      Coloration: Skin is not jaundiced or pale.      Findings: No bruising, erythema, lesion or rash.   Neurological:      General: No focal deficit present.      Mental Status: He is alert and oriented to person, place, and time.      Cranial Nerves: No cranial nerve deficit.      Motor: No weakness.   Psychiatric:         Mood and Affect: Mood normal.

## 2024-05-23 ENCOUNTER — OCCMED (OUTPATIENT)
Age: 39
End: 2024-05-23

## 2024-05-23 DIAGNOSIS — Z02.4 ENCOUNTER FOR DEPARTMENT OF TRANSPORTATION (DOT) EXAMINATION FOR DRIVING LICENSE RENEWAL: Primary | ICD-10-CM

## 2024-05-28 ENCOUNTER — OFFICE VISIT (OUTPATIENT)
Dept: FAMILY MEDICINE CLINIC | Facility: CLINIC | Age: 39
End: 2024-05-28

## 2024-05-28 VITALS
OXYGEN SATURATION: 96 % | TEMPERATURE: 98.6 F | BODY MASS INDEX: 39.12 KG/M2 | DIASTOLIC BLOOD PRESSURE: 80 MMHG | HEIGHT: 72 IN | SYSTOLIC BLOOD PRESSURE: 116 MMHG | WEIGHT: 288.8 LBS | HEART RATE: 98 BPM

## 2024-05-28 DIAGNOSIS — Z83.3 FAMILY HISTORY OF DIABETES MELLITUS (DM): ICD-10-CM

## 2024-05-28 DIAGNOSIS — Z76.89 ENCOUNTER TO ESTABLISH CARE WITH NEW DOCTOR: Primary | ICD-10-CM

## 2024-05-28 DIAGNOSIS — E11.9 TYPE 2 DIABETES MELLITUS WITHOUT COMPLICATION, WITHOUT LONG-TERM CURRENT USE OF INSULIN (HCC): ICD-10-CM

## 2024-05-28 DIAGNOSIS — Z91.89 AT RISK FOR OBSTRUCTIVE SLEEP APNEA: ICD-10-CM

## 2024-05-28 DIAGNOSIS — R06.83 SNORING: ICD-10-CM

## 2024-05-28 LAB
CREAT UR-MCNC: 100.4 MG/DL
LEFT EYE DIABETIC RETINOPATHY: ABNORMAL
LEFT EYE IMAGE QUALITY: ABNORMAL
LEFT EYE MACULAR EDEMA: ABNORMAL
LEFT EYE OTHER RETINOPATHY: ABNORMAL
MICROALBUMIN UR-MCNC: 7.4 MG/L
MICROALBUMIN/CREAT 24H UR: 7 MG/G CREATININE (ref 0–30)
RIGHT EYE DIABETIC RETINOPATHY: ABNORMAL
RIGHT EYE IMAGE QUALITY: ABNORMAL
RIGHT EYE MACULAR EDEMA: ABNORMAL
RIGHT EYE OTHER RETINOPATHY: ABNORMAL
SEVERITY (EYE EXAM): ABNORMAL
SL AMB POCT HEMOGLOBIN AIC: 11.8 (ref ?–6.5)

## 2024-05-28 PROCEDURE — 99204 OFFICE O/P NEW MOD 45 MIN: CPT | Performed by: FAMILY MEDICINE

## 2024-05-28 PROCEDURE — 82043 UR ALBUMIN QUANTITATIVE: CPT | Performed by: FAMILY MEDICINE

## 2024-05-28 PROCEDURE — 82570 ASSAY OF URINE CREATININE: CPT | Performed by: FAMILY MEDICINE

## 2024-05-28 PROCEDURE — 83036 HEMOGLOBIN GLYCOSYLATED A1C: CPT | Performed by: FAMILY MEDICINE

## 2024-05-28 RX ORDER — ATORVASTATIN CALCIUM 20 MG/1
20 TABLET, FILM COATED ORAL DAILY
Qty: 100 TABLET | Refills: 3 | Status: SHIPPED | OUTPATIENT
Start: 2024-05-28

## 2024-05-28 RX ORDER — METFORMIN HYDROCHLORIDE 500 MG/1
1000 TABLET, EXTENDED RELEASE ORAL
Qty: 200 TABLET | Refills: 1 | Status: SHIPPED | OUTPATIENT
Start: 2024-05-28

## 2024-05-28 NOTE — PATIENT INSTRUCTIONS
Type 2 Diabetes Management for Adults   AMBULATORY CARE:   Type 2 diabetes  is a disease that affects how your body uses glucose (sugar). Either your body cannot make enough insulin, or it cannot use the insulin correctly. It is important to keep diabetes controlled to prevent damage to your heart, blood vessels, and other organs. Management will help you feel well and enjoy your daily activities. Your diabetes care team providers can help you make a plan to fit diabetes care into your schedule. Your plan can change over time to fit your needs and your family's needs.       Have someone call your local emergency number (911 in the [place]) if:   You cannot be woken.    You have signs of diabetic ketoacidosis:     confusion, fatigue    vomiting    rapid heartbeat    fruity smelling breath    extreme thirst    dry mouth and skin    You have any of the following signs of a heart attack:      Squeezing, pressure, or pain in your chest    You may  also have any of the following:     Discomfort or pain in your back, neck, jaw, stomach, or arm    Shortness of breath    Nausea or vomiting    Lightheadedness or a sudden cold sweat    You have any of the following signs of a stroke:      Numbness or drooping on one side of your face     Weakness in an arm or leg    Confusion or difficulty speaking    Dizziness, a severe headache, or vision loss    Call your doctor or diabetes care team provider if:   You have a sore or wound that will not heal.    You have a change in the amount you urinate.    Your blood sugar levels are higher than your target goals.    You often have lower blood sugar levels than your target goals.    Your skin is red, dry, warm, or swollen.    You have trouble coping with diabetes, or you feel anxious or depressed.    You have trouble following any part of your care plan, such as your meal plan.    You have questions or concerns about your condition or care.    What you need to know about high blood sugar  levels:  High blood sugar levels may not cause any symptoms. You may feel more thirsty or urinate more often than usual. Over time, high blood sugar levels can damage your nerves, blood vessels, tissues, and organs. The following can increase your blood sugar levels:  Large meals or large amounts of carbohydrates at one time    Less physical activity    Stress    Illness    A lower dose of diabetes medicine or insulin, or a late dose    What you need to know about low blood sugar levels:  Symptoms include feeling shaky, dizzy, irritable, or confused. You can prevent symptoms by keeping your blood sugar levels from going too low.  Treat a low blood sugar level right away:      Drink 4 ounces of juice or have 1 tube of glucose gel.    Check your blood sugar level again 10 to 15 minutes later.    When the level goes back to normal, eat a meal or snack to prevent another decrease.       Keep glucose gel, raisins, or hard candy with you at all times to treat a low blood sugar level.     Your blood sugar level can get too low if you take diabetes medicine or insulin and do not eat enough food.     If you use insulin, check your blood sugar level before you exercise.      If your blood sugar level is below 100 mg/dL, eat 4 crackers or 2 ounces of raisins, or drink 4 ounces of juice.    Check your level every 30 minutes if you exercise longer than 1 hour.    You may need a snack during or after exercise.    What you can do to manage your blood sugar levels:   Check your blood sugar levels as directed and as needed.  Several items are available to use to check your levels. You may need to check by testing a drop of blood in a glucose monitor. You may instead be given a continuous glucose monitoring (CGM) device. The device is worn at all times. The CGM checks your blood sugar level every 5 minutes. It sends results to an electronic device such as a smart phone. A CGM can be used with or without an insulin pump. You and your  diabetes care team providers will decide on the best method for you. The goal for blood sugar levels before meals  is between 80 and 130 mg/dL and 2 hours after eating  is lower than 180 mg/dL.            Make healthy food choices.  Work with a dietitian to create a meal plan that works for you and your schedule. A dietitian can help you learn how to eat the right amount of carbohydrates (sugar and starchy foods) during your meals and snacks. Examples of carbohydrates are breads, cereals, rice, pasta, fruit, low-fat dairy, and sweets. Carbohydrates can raise your blood sugar level if you eat too many at one time.         Eat high-fiber foods as directed.  Fiber helps improve blood sugar levels. Fiber also lowers your risk for heart disease and other problems diabetes can cause. Examples of high-fiber foods include vegetables, whole-grain bread, and beans such as pretty beans. Your dietitian can tell you how much fiber to have each day.         Get regular physical activity.  Physical activity can help you get to your target blood sugar level goal and manage your weight. Get at least 150 minutes of moderate to vigorous aerobic physical activity each week. Resistance training, such as lifting weights, should be done 3 times each week. Do not miss more than 2 days of physical activity in a row. Do not sit longer than 30 minutes at a time. Your healthcare provider can help you create an activity plan. The plan can include the best activities for you and can help you build your strength and endurance.            Maintain a healthy weight.  Ask your team what a healthy weight is for you. A healthy weight can help you control diabetes and prevent heart disease. Ask your team to help you create a weight-loss plan, if needed. Even a loss of 3% to 7% of your excess body weight can help make a difference in managing diabetes. Your team will help you set a weight-loss goal, such as 10 to 15 pounds, or 5% of your extra weight.  Together you and your team can set manageable weight-loss goals.    Take your diabetes medicine or insulin as directed.  You may need diabetes medicine, insulin, or both to help control your blood sugar levels. Your healthcare provider will teach you how and when to take your diabetes medicine or insulin. You will also be taught about side effects oral diabetes medicine can cause. Insulin may be injected or given through a pump or pen. You and your providers will decide on the best method for you:    An insulin pump  is an implanted device that gives your insulin 24 hours a day. An insulin pump prevents the need for multiple insulin injections in a day.         An insulin pen  is a device prefilled with the right amount of insulin.         You and your family members will be taught how to draw up and give insulin  if this is the best method for you. Your providers will also teach you how to dispose of needles and syringes.    You will learn how much insulin you need  and when to give it. You will be taught when not to give insulin. You will also be taught what to do if your blood sugar level drops too low. This may happen if you take insulin and do not eat the right amount of carbohydrates.    More ways to manage type 2 diabetes:   Wear medical alert identification.  Wear medical alert jewelry or carry a card that says you have diabetes. Ask your provider where to get these items.         Do not smoke.  Nicotine and other chemicals in cigarettes and cigars can cause lung and blood vessel damage. It also makes it more difficult to manage your diabetes. Ask your provider for information if you currently smoke and need help to quit. Do not use e-cigarettes or smokeless tobacco in place of cigarettes or to help you quit. They still contain nicotine.    Check your feet each day for cuts, scratches, calluses, or other wounds.  Look for redness and swelling, and feel for warmth. Wear shoes that fit well. Check your shoes  for rocks or other objects that can hurt your feet. Do not walk barefoot or wear shoes without socks. Wear cotton socks to help keep your feet dry.         Ask about vaccines you may need.  You have a higher risk for serious illness if you get the flu, pneumonia, COVID-19, or hepatitis. Ask your provider if you should get vaccines to prevent these or other diseases, and when to get the vaccines.    Talk to your provider if you become stressed about diabetes care.  Sometimes being able to fit diabetes care into your life can cause increased stress. The stress can cause you not to take care of yourself properly. Your provider can help by offering tips about self-care. A mental health provider can listen and offer help with self-care issues. Other types of counseling can help you make nutrition or physical activity changes.    Have your A1c checked as directed.  Your provider may check your A1c every 3 months, or 2 times each year if your diabetes is controlled. An A1c test shows the average amount of sugar in your blood over the past 2 to 3 months. Your provider will tell you what your A1c level should be.    Have screening tests as directed.  Your provider may recommend screening for complications of diabetes and other conditions that may develop. Some screenings may begin right away and some may happen within the first 5 years of diagnosis:    Examples of diabetes complications  include kidney problems, high cholesterol, high blood pressure, blood vessel problems, eye problems, and sleep apnea.    You may be screened for a low vitamin B level  if you take oral diabetes medicine for a long time.    You may be screened for polycystic ovarian syndrome (PCOS)  if you are of childbearing age.    Follow up with your doctor or diabetes care team providers as directed:  You may need to have blood tests done before your follow-up visit. The test results will show if changes need to be made in your treatment or self-care.  Talk to your provider if you cannot afford your medicine. Write down your questions so you remember to ask them during your visits.  © Copyright [employer] 2023 Information is for End User's use only and may not be sold, redistributed or otherwise used for commercial purposes.  The above information is an  only. It is not intended as medical advice for individual conditions or treatments. Talk to your doctor, nurse or pharmacist before following any medical regimen to see if it is safe and effective for you.    Type 2 Diabetes in Adults: New Diagnosis   AMBULATORY CARE:   Type 2 diabetes  is a disease that affects how your body uses glucose (sugar). Normally, when the blood sugar level increases, the pancreas makes more insulin. Insulin helps move sugar out of the blood so it can be used for energy. Type 2 diabetes develops because either the body cannot make enough insulin, or it cannot use the insulin correctly. Type 2 diabetes can be controlled to prevent damage to your heart, blood vessels, and other organs.       Common symptoms include the following:   Numbness in your fingers or toes    Blurred vision    Urinating often    More hunger or thirst than usual    Have someone call your local emergency number (911 in the [place]) if:   You have any of the following signs of a stroke:      Numbness or drooping on one side of your face     Weakness in an arm or leg    Confusion or difficulty speaking    Dizziness, a severe headache, or vision loss    You have any of the following signs of a heart attack:      Squeezing, pressure, or pain in your chest    You may  also have any of the following:     Discomfort or pain in your back, neck, jaw, stomach, or arm    Shortness of breath    Nausea or vomiting    Lightheadedness or a sudden cold sweat    You have trouble breathing.    Seek care immediately if:   You have severe abdominal pain.    You vomit for more than 2 hours.    You have trouble staying awake or  focusing.    You are shaking or sweating.    You feel weak or more tired than usual.    You have blurred or double vision.    Your breath has a fruity, sweet smell.    Call your doctor or diabetes care team provider if:   Your arms and legs are swollen.    You have an upset stomach and cannot eat the foods on your meal plan.    You feel dizzy, have headaches, or are easily irritated.    Your skin is red, warm, dry, or swollen.    You have a wound that does not heal.    You have numbness in your arms or legs.    You have trouble coping with your illness, or you feel anxious or depressed.    You have trouble following any part of your care plan, such as your meal plan.    You have questions or concerns about your condition or care.    Treatment for type 2 diabetes  helps prevent or delay complications, including heart and kidney disease. You must eat healthy meals and do regular physical activity. Your diabetes providers may ask about your home life so they can create a care plan that works best for you. Your plan may  include any of the following:  Diabetes medicines or insulin  may be given to decrease the amount of sugar in your blood.    Blood pressure medicine  may be given to lower your blood pressure.    Cholesterol-lowering medicine  may be given to prevent heart disease.    Antiplatelets , such as aspirin, help prevent blood clots. Take your antiplatelet medicine exactly as directed. These medicines make it more likely for you to bleed or bruise. If you are told to take aspirin, do not take acetaminophen or ibuprofen instead.    Take your medicine as directed.  Contact your healthcare provider if you think your medicine is not helping or if you have side effects. Tell your provider if you are allergic to any medicine. Keep a list of the medicines, vitamins, and herbs you take. Include the amounts, and when and why you take them. Bring the list or the pill bottles to follow-up visits. Carry your medicine list  with you in case of an emergency.    Tests  may be used to check for type 2 diabetes starting at age 35 or sooner if you have 1 or more risk factors. Any of the following may be used to diagnose diabetes or check that it is well controlled:  An A1c test  shows the average amount of sugar in your blood over the past 2 to 3 months. Your diabetes care team provider will tell you the A1c level that is right for you.    A fasting plasma glucose test  is when your blood sugar level is tested after you have not eaten for 8 hours.    A 2-hour plasma glucose test  starts with a blood sugar level check after you have not eaten for 8 hours. You are then given a glucose drink. Your blood sugar level is checked after 2 hours.    A random glucose test  may be done any time of day, no matter how long ago you ate.    Diabetes education:  Diabetes education will start right away. Diabetes education may also happen later to refresh your memory. Your diabetes care team may include physicians, nurse practitioners, community health providers, and physician assistants. It may also include nurses, dietitians, exercise specialists, pharmacists, dentists, and podiatrists. Family members, or others who are close to you, may also be part of the team. You and your team will make goals and plans to manage diabetes and other health problems. The plans and goals will be specific to your needs. Members of your diabetes care team will teach you the following:  About nutrition:  A dietitian will help you make a meal plan to keep your blood sugar level steady. You will learn how food affects your blood sugar levels. You will also learn to keep track of carbohydrates (sugar and starchy foods). Do not skip meals. Your blood sugar level may drop too low if you have taken diabetes medicine and do not eat. You may be taught to use the plate method for portion control. With the plate method, ½ of your plate contains non-starchy vegetables. The other half  is divided so ¼ contains protein, and ¼ contains carbohydrates. Ask your care team for more information about meal planning.         About physical activity and diabetes:  You will learn why physical activity, such as walking, is important. You and your care team provider will make a plan for your activity.            About a healthy body weight:  You will learn how a healthy weight can help you control diabetes and prevent heart disease. Ask your team what a healthy weight is for you. Ask your team to help you create a weight-loss plan, if needed. Even a loss of 3% to 7% of your excess body weight can help make a difference in managing diabetes. Your team will help you set manageable weight-loss goals, such as 10 to 15 pounds, or 5% of your extra weight.    About your blood sugar level:  You will learn what your blood sugar level should be. You will be given information on when and how to check your blood sugar level. You may need to check by testing a drop of blood in a glucose monitor. You may instead be given a continuous glucose monitoring (CGM) device. A sensor is placed in your abdomen or on your arm. You put a transmitter on the sensor to get a reading that shows up on the monitor. You will learn what to do if your level is too high or too low. Write down the times of your checks and your levels. Take them to all follow-up appointments.            About diabetes medicine:  You may need oral diabetes medicine, insulin, or both to help control your blood sugar levels. Your healthcare provider will teach you how and when to take oral diabetes medicine. You will also be taught about side effects oral diabetes medicine can cause. Insulin may be added if oral diabetes medicine becomes less effective over time. Insulin may be injected, or given through a pump or pen. You and your care team will discuss which method is best for you.    An insulin pump  is an implanted device that gives your insulin 24 hours a day.  An insulin pump prevents the need for multiple insulin injections in a day.         An insulin pen  is a device prefilled with the right amount of insulin.         You and your family members will be taught how to draw up and give insulin  if this is the best method for you. Your education team will also teach you how to dispose of needles and syringes.    You will learn how much insulin you need  and when to give it. You will be taught when not to give insulin. You will also be taught what to do if your blood sugar level drops too low. This may happen if you take insulin and do not eat the right amount of carbohydrates.    Other ways to help manage type 2 diabetes:   Talk to your care team providers if you have increased stress about your diagnosis.  Stress about your diagnosis can keep you from taking care of yourself properly. Your team can help by offering tips about self-care. Your team may suggest you talk to a mental health provider who can listen and offer help with self-care issues. Other types of counseling can help you make nutrition or physical activity changes.    Check your feet each day for sores.  Wear shoes and socks that fit correctly. Do not trim your toenails. Go to a podiatrist. Ask your care team for more information about foot care.         Do not smoke.  Nicotine and other chemicals in cigarettes and cigars can cause lung damage and make diabetes harder to manage. Ask your care team provider for information if you currently smoke and need help to quit. E-cigarettes or smokeless tobacco still contain nicotine. Talk to your care team provider before you use these products.    Drink water instead of sugary drinks such as soft drinks and fruit juices.  Sugary drinks increase your blood sugar level and weight. Your healthcare provider may tell you that diet drinks do not help with weight loss.    Know the risks if you choose to drink alcohol.  Alcohol can cause your blood sugar levels to be low if  you use insulin. Alcohol can cause high blood sugar levels and weight gain if you drink too much. A drink of alcohol is 12 ounces of beer, 5 ounces of wine, or 1½ ounces of liquor. Your care team can tell you how many drinks are okay to have in 24 hours and in 1 week.    Check your blood pressure as directed.  If you have high blood pressure (BP), talk to your care team about your BP goals. Together you can create a plan to lower your BP if needed and keep it in a healthy range. The plan may include lifestyle changes or medicines. A normal BP is 119/79 or lower. A normal blood pressure can help prevent or delay certain complications from diabetes. Examples include retinopathy (eye damage) and kidney damage.         Wear medical alert identification.  Wear medical alert jewelry or carry a card that says you have diabetes. Ask your care team provider where to get these items.         Ask about vaccines you may need.  You have a higher risk for serious illness if you get the flu, pneumonia, COVID-19, or hepatitis. Ask your provider if you should get vaccines to prevent these or other diseases, and when to get the vaccines.    Risks of type 2 diabetes:  It is important to learn to keep your diabetes in control. Uncontrolled diabetes can damage your nerves, veins, and arteries. Your risk for dementia increases faster the longer your diabetes is not controlled. High blood sugar levels may damage other body tissues and organs over time. Damage to arteries may increase your risk for heart attack and stroke. Nerve damage may also lead to other heart, stomach, and nerve problems. Diabetes can become life-threatening if it is not controlled.  Follow up with your care team providers as directed:  You may need to return to have your A1c checked every 3 months. You will need to have your feet checked during at least 1 visit each year. You will need an eye exam 1 time each year to check for retinopathy. You will also need tests to  check for kidney or heart disease, and high blood pressure. Write down your questions so you remember to ask them during your visits.  © Copyright [employer] 2023 Information is for End User's use only and may not be sold, redistributed or otherwise used for commercial purposes.  The above information is an  only. It is not intended as medical advice for individual conditions or treatments. Talk to your doctor, nurse or pharmacist before following any medical regimen to see if it is safe and effective for you.    Basic Carbohydrate Counting   AMBULATORY CARE:   Carbohydrate counting  is a way to plan your meals by counting the amount of carbohydrate in foods. Carbohydrates are the sugars, starches, and fiber found in fruit, grains, vegetables, and milk products. Carbohydrates increase your blood sugar levels. Carbohydrate counting can help you eat the right amount of carbohydrate to keep your blood sugar levels under control.   What you need to know about planning meals using carbohydrate counting:  A dietitian or healthcare provider will help you develop a healthy meal plan that works best for you. You will be taught how much carbohydrate to eat or drink for each meal and snack. Your meal plan will be based on your age, weight, usual food intake, and physical activity level. If you have diabetes, it will also include your blood sugar levels and diabetes medicine. Once you know how much carbohydrate you should eat, you can decide what type of food you want to eat.    You will need to know what foods contain carbohydrate and how much they contain. Keep track of the amount of carbohydrate in meals and snacks in order to follow your meal plan. Do not avoid carbohydrates or skip meals. Your blood sugar may fall too low if you do not eat enough carbohydrate or you skip meals.    Foods that contain carbohydrate:   Breads:  Each serving of food listed below contains about 15 g of carbohydrate .    1 slice of  bread (1 ounce) or 1 flour or corn tortilla (6 inch)    ½ of a hamburger bun or ¼ of a large bagel (about 1 ounce)    1 pancake (about 4 inches across and ¼ inch thick)    Cereals and grains:  Serving sizes of ready-to-eat cereals vary. Look at the serving size and the total carbohydrate amount listed on the food label. Each serving of food listed below contains about 15 g of carbohydrate .    ¾ cup of dry, unsweetened, ready-to-eat cereal or ¼ cup of low-fat granola     ½ cup of oatmeal or other cooked cereal     ? cup of cooked rice or pasta    Starchy vegetables and beans:  Each serving of food listed below contains about 15 g of carbohydrate .    ½ cup of corn, green peas, sweet potatoes, or mashed potatoes    ¼ of a large baked potato    ½ cup of beans, lentils, and peas (garbanzo, pretty, kidney, white, split, black-eyed)    Crackers and snacks:  Each serving of food listed below contains about 15 g of carbohydrate .    3 jose antonio cracker squares or 8 animal crackers     6 saltine-type crackers    3 cups of popcorn or ¾ ounce of pretzels, potato chips, or tortilla chips    Fruit:  Each serving of food listed below contains about 15 g of carbohydrate .    1 small (4 ounce) piece of fresh fruit or ¾ to 1 cup of fresh fruit    ½ cup of canned or frozen fruit, packed in natural juice    ½ cup (4 ounces) of unsweetened fruit juice    2 tablespoons of dried fruit    Desserts or sugary foods:  Each serving of food listed below contains about 15 g of carbohydrate .    2-inch square unfrosted cake or brownie     2 small cookies    ½ cup of ice cream, frozen yogurt, or nondairy frozen yogurt    ¼ cup of sherbet or sorbet    1 tablespoon of regular syrup, jam, or jelly    2 tablespoons of light syrup    Milk and yogurt:  Foods from the milk group contain about 12 g of carbohydrate per serving.    1 cup of fat-free or low-fat milk    1 cup of soy milk    ? cup of fat-free, yogurt sweetened with artificial  sweetener    Non-starchy vegetables:  Each serving contains about 5 g of carbohydrate . Three servings of non-starch vegetables count as 1 carbohydrate serving.     ½ cup of cooked vegetables or 1 cup of raw vegetables. This includes beets, broccoli, cabbage, cauliflower, cucumber, mushrooms, tomatoes, and zucchini    ½ cup of vegetable juice    How to use carbohydrate counting to plan meals:   Count carbohydrate amounts using serving sizes:      Pasta dinner example:  You plan to have pasta, tossed salad, and an 8-ounce glass of milk. Your healthcare provider tells you that you may have 4 carbohydrate servings for dinner. One carbohydrate serving of pasta is ? cup. One cup of pasta will equal 3 carbohydrate servings. An 8-ounce glass of milk will count as 1 carbohydrate serving. These amounts of food would equal 4 carbohydrate servings. One cup of tossed salad does not count toward your carbohydrate servings.       Count carbohydrate amounts using food labels:  Find the total amount of carbohydrate in a packaged food by reading the food label. Food labels tell you the serving size of the food and the total carbohydrate amount in each serving. Find the serving size on the food label and then decide how many servings you will eat. Multiply the number of servings you plan to eat by the carbohydrate amount per serving.     Granola bar snack example:  Your meal plan allows you to have 2 carbohydrate servings (30 grams) of carbohydrate for a snack. You plan to eat 1 package of granola bars, which contains 2 bars. According to the food label, the serving size of food in this package is 1 bar. Each serving (1 bar) contains 25 grams of carbohydrate. The total amount of carbohydrate in this package of granola bars would be 50 g. Based on your meal plan, you should eat only 1 bar.      Follow up with your doctor as directed:  Write down your questions so you remember to ask them during your visits.  © Copyright [employer] 2023  Information is for End User's use only and may not be sold, redistributed or otherwise used for commercial purposes.  The above information is an  only. It is not intended as medical advice for individual conditions or treatments. Talk to your doctor, nurse or pharmacist before following any medical regimen to see if it is safe and effective for you.

## 2024-05-28 NOTE — PROGRESS NOTES
Ambulatory Visit  Name: Mo Collier      : 1985      MRN: [id number]  Encounter Provider: Zoya Vick DO  Encounter Date: 2024   Encounter department: [address]    Assessment & Plan   1. Encounter to establish care with new doctor  2. Type 2 diabetes mellitus without complication, without long-term current use of insulin (HCC)  -     POCT hemoglobin A1c  -     IRIS Diabetic eye exam  -     Comprehensive metabolic panel; Future; Expected date: 2024  -     CBC and Platelet; Future; Expected date: 2024  -     Lipid Panel with Direct LDL reflex; Future; Expected date: 2024  -     Ambulatory referral to Diabetic Education - use to refer for diabetes group classes, individual diabetes education, medical nutrition therapy, device training; Future; Expected date: 2024  -     metFORMIN (GLUCOPHAGE-XR) 500 mg 24 hr tablet; Take 2 tablets (1,000 mg total) by mouth daily with breakfast  -     atorvastatin (LIPITOR) 20 mg tablet; Take 1 tablet (20 mg total) by mouth daily  -     Albumin / creatinine urine ratio  -     TSH, 3rd generation with Free T4 reflex; Future  -     Albumin / creatinine urine ratio; Future  -     Albumin / creatinine urine ratio  3. Family history of diabetes mellitus (DM)  4. BMI 39.0-39.9,adult  -     Ambulatory Referral to Sleep Medicine; Future  -     TSH, 3rd generation with Free T4 reflex; Future  5. At risk for obstructive sleep apnea  -     Ambulatory Referral to Sleep Medicine; Future  6. Snoring  -     Ambulatory Referral to Sleep Medicine; Future      Depression Screening and Follow-up Plan: Patient was screened for depression during today's encounter. They screened negative with a PHQ-2 score of 1.      History of Present Illness     Blood Sugar Problem      Patient presents to the office to establish care.     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     Reports ethel  "since the physical, he has used his mother's glucometer and sugar has consistently been above 300.       STOP-BANG Questionnaire (AZALIA)     Snoring Loud enough to hear through a door? Yes   Tired Falling asleep while driving or talking? Yes   Observed Gasping or choking while sleeping? Yes   Pressure Hypertension? No   BMI >35? Yes   Age  Older then 50? No   Neck Size   >17 in Male?  >16 in Female? Yes   Male  Yes        Risk Low (0-2)  Intermediate (3-4)  High (5-8) high     Review of Systems    Objective     /80 (BP Location: Left arm, Patient Position: Sitting, Cuff Size: Standard)   Pulse 98   Temp 98.6 °F (37 °C) (Tympanic)   Ht 5' 11.5\" (1.816 m)   Wt 131 kg (288 lb 12.8 oz)   SpO2 96%   BMI 39.72 kg/m²     Physical Exam  Vitals reviewed.   Constitutional:       General: He is not in acute distress.     Appearance: Normal appearance. He is obese.   HENT:      Head: Normocephalic and atraumatic.      Right Ear: External ear normal.      Left Ear: External ear normal.      Nose: Nose normal.      Mouth/Throat:      Mouth: Mucous membranes are moist.   Eyes:      Extraocular Movements: Extraocular movements intact.      Conjunctiva/sclera: Conjunctivae normal.      Pupils: Pupils are equal, round, and reactive to light.   Cardiovascular:      Rate and Rhythm: Normal rate and regular rhythm.      Pulses: no weak pulses.           Dorsalis pedis pulses are 2+ on the right side and 2+ on the left side.        Posterior tibial pulses are 2+ on the right side and 2+ on the left side.      Heart sounds: Normal heart sounds.   Pulmonary:      Effort: Pulmonary effort is normal.      Breath sounds: Normal breath sounds. No wheezing, rhonchi or rales.   Abdominal:      General: Bowel sounds are normal. There is no distension.      Palpations: Abdomen is soft.      Tenderness: There is no abdominal tenderness.   Musculoskeletal:      Cervical back: Neck supple.      Right lower leg: No edema.      Left lower " leg: No edema.   Feet:      Right foot:      Skin integrity: No ulcer, skin breakdown, erythema, warmth, callus or dry skin.      Left foot:      Skin integrity: No ulcer, skin breakdown, erythema, warmth, callus or dry skin.   Lymphadenopathy:      Cervical: No cervical adenopathy.   Skin:     General: Skin is warm.      Capillary Refill: Capillary refill takes less than 2 seconds.      Findings: No rash.   Neurological:      Mental Status: He is alert. Mental status is at baseline.         Patient's shoes and socks removed.    Right Foot/Ankle   Right Foot Inspection  Skin Exam: skin normal and skin intact. No dry skin, no warmth, no callus, no erythema, no maceration, no abnormal color, no pre-ulcer, no ulcer and no callus.     Toe Exam: ROM and strength within normal limits.     Sensory   Vibration: intact  Proprioception: intact  Monofilament testing: intact    Vascular  Capillary refills: < 3 seconds  The right DP pulse is 2+. The right PT pulse is 2+.     Left Foot/Ankle  Left Foot Inspection  Skin Exam: skin normal and skin intact. No dry skin, no warmth, no erythema, no maceration, normal color, no pre-ulcer, no ulcer and no callus.     Toe Exam: ROM and strength within normal limits.     Sensory   Vibration: intact  Proprioception: intact  Monofilament testing: intact    Vascular  Capillary refills: < 3 seconds  The left DP pulse is 2+. The left PT pulse is 2+.     Assign Risk Category  No deformity present  No loss of protective sensation  No weak pulses  Risk: 0     Administrative Statements       DO Rafael Mccann [facility]  5/28/2024 11:37 AM

## 2024-05-29 DIAGNOSIS — E66.09 CLASS 2 OBESITY DUE TO EXCESS CALORIES WITH BODY MASS INDEX (BMI) OF 39.0 TO 39.9 IN ADULT, UNSPECIFIED WHETHER SERIOUS COMORBIDITY PRESENT: Primary | ICD-10-CM

## 2024-05-29 DIAGNOSIS — R06.83 SNORING: ICD-10-CM

## 2024-05-29 DIAGNOSIS — Z91.89 AT RISK FOR OBSTRUCTIVE SLEEP APNEA: ICD-10-CM

## 2024-08-28 ENCOUNTER — OFFICE VISIT (OUTPATIENT)
Dept: FAMILY MEDICINE CLINIC | Facility: CLINIC | Age: 39
End: 2024-08-28

## 2024-08-28 VITALS
WEIGHT: 299 LBS | BODY MASS INDEX: 40.5 KG/M2 | SYSTOLIC BLOOD PRESSURE: 122 MMHG | OXYGEN SATURATION: 98 % | HEIGHT: 72 IN | TEMPERATURE: 97 F | HEART RATE: 86 BPM | DIASTOLIC BLOOD PRESSURE: 78 MMHG

## 2024-08-28 DIAGNOSIS — E11.9 TYPE 2 DIABETES MELLITUS WITHOUT COMPLICATION, WITHOUT LONG-TERM CURRENT USE OF INSULIN (HCC): ICD-10-CM

## 2024-08-28 DIAGNOSIS — Z00.00 ANNUAL PHYSICAL EXAM: Primary | ICD-10-CM

## 2024-08-28 LAB
LEFT EYE DIABETIC RETINOPATHY: NORMAL
LEFT EYE IMAGE QUALITY: NORMAL
LEFT EYE MACULAR EDEMA: NORMAL
LEFT EYE OTHER RETINOPATHY: NORMAL
RIGHT EYE DIABETIC RETINOPATHY: NORMAL
RIGHT EYE IMAGE QUALITY: NORMAL
RIGHT EYE MACULAR EDEMA: NORMAL
RIGHT EYE OTHER RETINOPATHY: NORMAL
SEVERITY (EYE EXAM): NORMAL
SL AMB POCT HEMOGLOBIN AIC: 8.4 (ref ?–6.5)

## 2024-08-28 PROCEDURE — 99396 PREV VISIT EST AGE 40-64: CPT | Performed by: FAMILY MEDICINE

## 2024-08-28 PROCEDURE — 83036 HEMOGLOBIN GLYCOSYLATED A1C: CPT | Performed by: FAMILY MEDICINE

## 2024-08-28 RX ORDER — ATORVASTATIN CALCIUM 20 MG/1
20 TABLET, FILM COATED ORAL DAILY
Qty: 100 TABLET | Refills: 3 | Status: SHIPPED | OUTPATIENT
Start: 2024-08-28

## 2024-08-28 RX ORDER — METFORMIN HCL 500 MG
1000 TABLET, EXTENDED RELEASE 24 HR ORAL 2 TIMES DAILY WITH MEALS
Qty: 400 TABLET | Refills: 1 | Status: SHIPPED | OUTPATIENT
Start: 2024-08-28

## 2024-08-28 NOTE — PROGRESS NOTES
Adult Annual Physical  Name: Mo Collier      : 1985      MRN: [id number]  Encounter Provider: Zoya Vick DO  Encounter Date: 2024   Encounter department: [facility] [address]    Assessment & Plan   1. Annual physical exam  2. Type 2 diabetes mellitus without complication, without long-term current use of insulin (HCC)  -     POCT hemoglobin A1c  -     IRIS Diabetic eye exam  -     metFORMIN (GLUCOPHAGE-XR) 500 mg 24 hr tablet; Take 2 tablets (1,000 mg total) by mouth 2 (two) times a day with meals  -     atorvastatin (LIPITOR) 20 mg tablet; Take 1 tablet (20 mg total) by mouth daily    Lab Results   Component Value Date    HGBA1C 8.4 (A) 2024     Improved from 11.8, will increase Metformin to 1000 mg BID.     Immunizations and preventive care screenings were discussed with patient today. Appropriate education was printed on patient's after visit summary.    Discussed risks and benefits of prostate cancer screening. We discussed the controversial history of PSA screening for prostate cancer in the [place] as well as the risk of over detection and over treatment of prostate cancer by way of PSA screening.  The patient understands that PSA blood testing is an imperfect way to screen for prostate cancer and that elevated PSA levels in the blood may also be caused by infection, inflammation, prostatic trauma or manipulation, urological procedures, or by benign prostatic enlargement.    The role of the digital rectal examination in prostate cancer screening was also discussed and I discussed with him that there is large interobserver variability in the findings of digital rectal examination.    Counseling:  Alcohol/drug use: discussed moderation in alcohol intake, the recommendations for healthy alcohol use, and avoidance of illicit drug use.  Dental Health: discussed importance of regular tooth brushing, flossing, and dental visits.  Sexual health:  "discussed sexually transmitted diseases, partner selection, use of condoms, avoidance of unintended pregnancy, and contraceptive alternatives.  Exercise: the importance of regular exercise/physical activity was discussed. Recommend exercise 3-5 times per week for at least 30 minutes.       Depression Screening and Follow-up Plan: Patient was screened for depression during today's encounter. They screened negative with a PHQ-2 score of 0.      History of Present Illness     Adult Annual Physical:  Patient presents for annual physical. Notes that he has been compliant with his medication. He is checking blood sugar in AM, ranging from 150-180s. .     Diet and Physical Activity:  - Diet/Nutrition: well balanced diet. not eating after 8 pm.  - Exercise: no formal exercise.    Depression Screening:  - PHQ-2 Score: 0    General Health:  - Sleep: sleeps well.  - Hearing: normal hearing bilateral ears.  - Vision: no vision problems and most recent eye exam > 1 year ago.  - Dental: brushes teeth twice daily and no dental visits for > 1 year.     Health:  - History of STDs: no.   - Urinary symptoms: none.     Review of Systems      Objective     /78 (BP Location: Left arm, Patient Position: Sitting, Cuff Size: Adult)   Pulse 86   Temp (!) 97 °F (36.1 °C) (Tympanic)   Ht 5' 11.5\" (1.816 m)   Wt 136 kg (299 lb)   SpO2 98%   BMI 41.12 kg/m²     Physical Exam  Vitals reviewed.   Constitutional:       General: He is not in acute distress.     Appearance: Normal appearance. He is obese.   HENT:      Head: Normocephalic and atraumatic.      Right Ear: External ear normal.      Left Ear: External ear normal.      Nose: Nose normal.      Mouth/Throat:      Mouth: Mucous membranes are moist.   Eyes:      Extraocular Movements: Extraocular movements intact.      Conjunctiva/sclera: Conjunctivae normal.      Pupils: Pupils are equal, round, and reactive to light.   Cardiovascular:      Rate and Rhythm: Normal rate and " regular rhythm.      Heart sounds: Normal heart sounds.   Pulmonary:      Effort: Pulmonary effort is normal.      Breath sounds: Normal breath sounds. No wheezing, rhonchi or rales.   Abdominal:      General: Bowel sounds are normal. There is no distension.      Palpations: Abdomen is soft.      Tenderness: There is no abdominal tenderness.   Musculoskeletal:      Cervical back: Neck supple.      Right lower leg: No edema.      Left lower leg: No edema.   Lymphadenopathy:      Cervical: No cervical adenopathy.   Skin:     General: Skin is warm.      Capillary Refill: Capillary refill takes less than 2 seconds.      Findings: No rash.   Neurological:      Mental Status: He is alert. Mental status is at baseline.         DO Ruben Mccannroe [facility]  8/28/2024 7:55 AM

## 2024-12-02 ENCOUNTER — OFFICE VISIT (OUTPATIENT)
Dept: FAMILY MEDICINE CLINIC | Facility: CLINIC | Age: 39
End: 2024-12-02

## 2024-12-02 VITALS
WEIGHT: 304 LBS | SYSTOLIC BLOOD PRESSURE: 126 MMHG | BODY MASS INDEX: 41.17 KG/M2 | DIASTOLIC BLOOD PRESSURE: 76 MMHG | TEMPERATURE: 97.8 F | HEIGHT: 72 IN | OXYGEN SATURATION: 98 % | HEART RATE: 83 BPM

## 2024-12-02 DIAGNOSIS — E11.9 TYPE 2 DIABETES MELLITUS WITHOUT COMPLICATION, WITHOUT LONG-TERM CURRENT USE OF INSULIN (HCC): Primary | ICD-10-CM

## 2024-12-02 DIAGNOSIS — J45.40 MODERATE PERSISTENT ASTHMA WITHOUT COMPLICATION: ICD-10-CM

## 2024-12-02 LAB — SL AMB POCT HEMOGLOBIN AIC: 7 (ref ?–6.5)

## 2024-12-02 PROCEDURE — 83036 HEMOGLOBIN GLYCOSYLATED A1C: CPT | Performed by: FAMILY MEDICINE

## 2024-12-02 PROCEDURE — 99214 OFFICE O/P EST MOD 30 MIN: CPT | Performed by: FAMILY MEDICINE

## 2024-12-02 RX ORDER — ALBUTEROL SULFATE 90 UG/1
2 INHALANT RESPIRATORY (INHALATION) 4 TIMES DAILY
Qty: 8 G | Refills: 0 | Status: SHIPPED | OUTPATIENT
Start: 2024-12-02

## 2024-12-02 RX ORDER — METFORMIN HYDROCHLORIDE 500 MG/1
1000 TABLET, EXTENDED RELEASE ORAL 2 TIMES DAILY WITH MEALS
Qty: 400 TABLET | Refills: 1 | Status: SHIPPED | OUTPATIENT
Start: 2024-12-02

## 2024-12-02 RX ORDER — ATORVASTATIN CALCIUM 20 MG/1
20 TABLET, FILM COATED ORAL DAILY
Qty: 100 TABLET | Refills: 3 | Status: SHIPPED | OUTPATIENT
Start: 2024-12-02

## 2024-12-02 NOTE — ASSESSMENT & PLAN NOTE
Improved controlled, Hgb A1c of 11.8 on 5/2024. Discussed GLP-1 once insurance becomes active.   Lab Results   Component Value Date    HGBA1C 7.0 (A) 12/02/2024     Orders:    POCT hemoglobin A1c    metFORMIN (GLUCOPHAGE-XR) 500 mg 24 hr tablet; Take 2 tablets (1,000 mg total) by mouth 2 (two) times a day with meals    atorvastatin (LIPITOR) 20 mg tablet; Take 1 tablet (20 mg total) by mouth daily

## 2024-12-02 NOTE — ASSESSMENT & PLAN NOTE
Orders:    albuterol (PROVENTIL HFA,VENTOLIN HFA) 90 mcg/act inhaler; Inhale 2 puffs 4 (four) times a day

## 2024-12-02 NOTE — PROGRESS NOTES
"Name: Mo Collier      : 1985      MRN: [id number]  Encounter Provider: Zoya Vick DO  Encounter Date: 2024   Encounter department: [facility] [address]  :  Assessment & Plan  Type 2 diabetes mellitus without complication, without long-term current use of insulin (HCC)  Improved controlled, Hgb A1c of 11.8 on 2024. Discussed GLP-1 once insurance becomes active.   Lab Results   Component Value Date    HGBA1C 7.0 (A) 2024     Orders:    POCT hemoglobin A1c    metFORMIN (GLUCOPHAGE-XR) 500 mg 24 hr tablet; Take 2 tablets (1,000 mg total) by mouth 2 (two) times a day with meals    atorvastatin (LIPITOR) 20 mg tablet; Take 1 tablet (20 mg total) by mouth daily    Moderate persistent asthma without complication    Orders:    albuterol (PROVENTIL HFA,VENTOLIN HFA) 90 mcg/act inhaler; Inhale 2 puffs 4 (four) times a day         History of Present Illness     HPI    Patient presents to the office for follow up. Notes that he is working on eating smaller portions. Checking glucose in the AM fasting, between 130s-150s. Compliant with medications most times, states taht he has missed some doses here and there but overall is taking them consistently.     Started working at the [employer]. States that he should have insurance in the next 30 days.     Notes that his asthma feels well controlled at this point.     Review of Systems       Objective   /76   Pulse 83   Temp 97.8 °F (36.6 °C)   Ht 5' 11.5\" (1.816 m)   Wt (!) 138 kg (304 lb)   SpO2 98%   BMI 41.81 kg/m²      Physical Exam  Vitals reviewed.   Constitutional:       General: He is not in acute distress.     Appearance: Normal appearance. He is obese.   HENT:      Head: Normocephalic and atraumatic.      Right Ear: External ear normal.      Left Ear: External ear normal.      Nose: Nose normal.      Mouth/Throat:      Mouth: Mucous membranes are moist.   Eyes:      Extraocular Movements: Extraocular " movements intact.      Conjunctiva/sclera: Conjunctivae normal.   Cardiovascular:      Rate and Rhythm: Normal rate and regular rhythm.      Heart sounds: Normal heart sounds.   Pulmonary:      Effort: Pulmonary effort is normal.      Breath sounds: Normal breath sounds. No wheezing, rhonchi or rales.   Abdominal:      General: Bowel sounds are normal. There is no distension.      Palpations: Abdomen is soft.      Tenderness: There is no abdominal tenderness.   Musculoskeletal:      Right lower leg: No edema.      Left lower leg: No edema.   Skin:     General: Skin is warm.      Capillary Refill: Capillary refill takes less than 2 seconds.      Findings: No rash.   Neurological:      Mental Status: He is alert. Mental status is at baseline.           DO Rafael Mccann [facility]  12/2/2024 10:11 AM

## 2025-04-09 ENCOUNTER — OFFICE VISIT (OUTPATIENT)
Dept: FAMILY MEDICINE CLINIC | Facility: CLINIC | Age: 40
End: 2025-04-09
Payer: COMMERCIAL

## 2025-04-09 VITALS
WEIGHT: 308.8 LBS | SYSTOLIC BLOOD PRESSURE: 128 MMHG | DIASTOLIC BLOOD PRESSURE: 82 MMHG | OXYGEN SATURATION: 97 % | HEIGHT: 72 IN | HEART RATE: 95 BPM | BODY MASS INDEX: 41.83 KG/M2

## 2025-04-09 DIAGNOSIS — E11.9 TYPE 2 DIABETES MELLITUS WITHOUT COMPLICATION, WITHOUT LONG-TERM CURRENT USE OF INSULIN (HCC): ICD-10-CM

## 2025-04-09 DIAGNOSIS — J45.40 MODERATE PERSISTENT ASTHMA WITHOUT COMPLICATION: ICD-10-CM

## 2025-04-09 LAB — SL AMB POCT HEMOGLOBIN AIC: 7.3 (ref ?–6.5)

## 2025-04-09 PROCEDURE — 99214 OFFICE O/P EST MOD 30 MIN: CPT | Performed by: FAMILY MEDICINE

## 2025-04-09 PROCEDURE — 83036 HEMOGLOBIN GLYCOSYLATED A1C: CPT | Performed by: FAMILY MEDICINE

## 2025-04-09 RX ORDER — METFORMIN HYDROCHLORIDE 500 MG/1
1000 TABLET, EXTENDED RELEASE ORAL 2 TIMES DAILY WITH MEALS
Qty: 400 TABLET | Refills: 1 | Status: SHIPPED | OUTPATIENT
Start: 2025-04-09

## 2025-04-09 RX ORDER — ATORVASTATIN CALCIUM 20 MG/1
20 TABLET, FILM COATED ORAL DAILY
Qty: 100 TABLET | Refills: 3 | Status: SHIPPED | OUTPATIENT
Start: 2025-04-09

## 2025-04-09 RX ORDER — ALBUTEROL SULFATE 90 UG/1
2 INHALANT RESPIRATORY (INHALATION) 4 TIMES DAILY
Qty: 8 G | Refills: 0 | Status: SHIPPED | OUTPATIENT
Start: 2025-04-09

## 2025-04-09 RX ORDER — BUDESONIDE AND FORMOTEROL FUMARATE DIHYDRATE 160; 4.5 UG/1; UG/1
2 AEROSOL RESPIRATORY (INHALATION) 2 TIMES DAILY
Qty: 10.2 G | Refills: 1 | Status: SHIPPED | OUTPATIENT
Start: 2025-04-09

## 2025-04-09 NOTE — ASSESSMENT & PLAN NOTE
Orders:  •  albuterol (PROVENTIL HFA,VENTOLIN HFA) 90 mcg/act inhaler; Inhale 2 puffs 4 (four) times a day  •  budesonide-formoterol (Symbicort) 160-4.5 mcg/act inhaler; Inhale 2 puffs 2 (two) times a day

## 2025-04-09 NOTE — ASSESSMENT & PLAN NOTE
Lab Results   Component Value Date    HGBA1C 7.3 (A) 04/09/2025     Orders:  •  POCT hemoglobin A1c  •  atorvastatin (LIPITOR) 20 mg tablet; Take 1 tablet (20 mg total) by mouth daily  •  metFORMIN (GLUCOPHAGE-XR) 500 mg 24 hr tablet; Take 2 tablets (1,000 mg total) by mouth 2 (two) times a day with meals  •  Albumin / creatinine urine ratio; Future  •  semaglutide, 0.25 or 0.5 mg/dose, (Ozempic, 0.25 or 0.5 MG/DOSE,) 2 mg/3 mL injection pen; Inject 0.25 mg under the skin once weekly for 28 days, THEN inject 0.5 mg under the skin once weekly

## 2025-04-09 NOTE — PROGRESS NOTES
"Name: Mo Collier      : 1985      MRN: [id number]  Encounter Provider: Zoya Vick DO  Encounter Date: 2025   Encounter department: [facility] [address]  :  Assessment & Plan  Moderate persistent asthma without complication    Orders:  •  albuterol (PROVENTIL HFA,VENTOLIN HFA) 90 mcg/act inhaler; Inhale 2 puffs 4 (four) times a day  •  budesonide-formoterol (Symbicort) 160-4.5 mcg/act inhaler; Inhale 2 puffs 2 (two) times a day    Type 2 diabetes mellitus without complication, without long-term current use of insulin ([facility])    Lab Results   Component Value Date    HGBA1C 7.3 (A) 2025     Orders:  •  POCT hemoglobin A1c  •  atorvastatin (LIPITOR) 20 mg tablet; Take 1 tablet (20 mg total) by mouth daily  •  metFORMIN (GLUCOPHAGE-XR) 500 mg 24 hr tablet; Take 2 tablets (1,000 mg total) by mouth 2 (two) times a day with meals  •  Albumin / creatinine urine ratio; Future  •  semaglutide, 0.25 or 0.5 mg/dose, (Ozempic, 0.25 or 0.5 MG/DOSE,) 2 mg/3 mL injection pen; Inject 0.25 mg under the skin once weekly for 28 days, THEN inject 0.5 mg under the skin once weekly        Depression Screening and Follow-up Plan: Patient was screened for depression during today's encounter. They screened negative with a PHQ-2 score of 0.        History of Present Illness   HPI    Notes that he ran out of Metformin about 1 month ago. Did not have insurance, did not refill. Notes that he was intermittently taking his mother's Metformin but not consistently. Denies any numbness or tingling. States taht overall, he is doing well. Happy to have insurance and get back on his medication.     Review of Systems    Objective   /82   Pulse 95   Ht 5' 11.5\" (1.816 m)   Wt (!) 140 kg (308 lb 12.8 oz)   SpO2 97%   BMI 42.47 kg/m²      Physical Exam  Vitals reviewed.   Constitutional:       General: He is not in acute distress.     Appearance: Normal appearance. He is obese. "   HENT:      Head: Normocephalic and atraumatic.      Right Ear: External ear normal.      Left Ear: External ear normal.      Nose: Nose normal.      Mouth/Throat:      Mouth: Mucous membranes are moist.   Eyes:      Extraocular Movements: Extraocular movements intact.      Conjunctiva/sclera: Conjunctivae normal.      Pupils: Pupils are equal, round, and reactive to light.   Cardiovascular:      Rate and Rhythm: Normal rate and regular rhythm.      Pulses: no weak pulses.           Dorsalis pedis pulses are 2+ on the right side and 2+ on the left side.        Posterior tibial pulses are 2+ on the right side and 2+ on the left side.      Heart sounds: Normal heart sounds.   Pulmonary:      Effort: Pulmonary effort is normal.      Breath sounds: Normal breath sounds. No wheezing, rhonchi or rales.   Abdominal:      General: Bowel sounds are normal. There is no distension.      Palpations: Abdomen is soft.      Tenderness: There is no abdominal tenderness.   Musculoskeletal:      Cervical back: Neck supple.      Right lower leg: No edema.      Left lower leg: No edema.   Feet:      Right foot:      Skin integrity: No ulcer, skin breakdown, erythema, warmth, callus or dry skin.      Left foot:      Skin integrity: No ulcer, skin breakdown, erythema, warmth, callus or dry skin.   Lymphadenopathy:      Cervical: No cervical adenopathy.   Skin:     General: Skin is warm.      Capillary Refill: Capillary refill takes less than 2 seconds.      Findings: No rash.   Neurological:      Mental Status: He is alert. Mental status is at baseline.       Diabetic Foot Exam    Patient's shoes and socks removed.    Right Foot/Ankle   Right Foot Inspection  Skin Exam: skin normal and skin intact. No dry skin, no warmth, no callus, no erythema, no maceration, no abnormal color, no pre-ulcer, no ulcer and no callus.     Toe Exam: ROM and strength within normal limits.     Sensory   Vibration: intact  Proprioception: intact  Monofilament  testing: intact    Vascular  Capillary refills: < 3 seconds  The right DP pulse is 2+. The right PT pulse is 2+.     Left Foot/Ankle  Left Foot Inspection  Skin Exam: skin normal and skin intact. No dry skin, no warmth, no erythema, no maceration, normal color, no pre-ulcer, no ulcer and no callus.     Toe Exam: ROM and strength within normal limits.     Sensory   Vibration: intact  Proprioception: intact  Monofilament testing: intact    Vascular  Capillary refills: < 3 seconds  The left DP pulse is 2+. The left PT pulse is 2+.     Assign Risk Category  No deformity present  No loss of protective sensation  No weak pulses  Risk: 0        DO Rafael Mccann [facility]  4/9/2025 9:44 AM

## 2025-04-11 ENCOUNTER — TELEPHONE (OUTPATIENT)
Dept: FAMILY MEDICINE CLINIC | Facility: CLINIC | Age: 40
End: 2025-04-11

## 2025-04-11 NOTE — TELEPHONE ENCOUNTER
PA for Ozempic, 0.25 or 0.5 MG/DOSE SUBMITTED to [employer]    via    [x]CMM-KEY: [id number]  []Surescripts-Case ID #   []Availity-Auth ID # NDC #   []Faxed to plan   []Other website   []Phone call Case ID #     []PA sent as URGENT    All office notes, labs and other pertaining documents and studies sent. Clinical questions answered. Awaiting determination from insurance company.     Turnaround time for your insurance to make a decision on your Prior Authorization can take 7-21 business days.

## 2025-04-14 ENCOUNTER — APPOINTMENT (OUTPATIENT)
Dept: LAB | Facility: HOSPITAL | Age: 40
End: 2025-04-14
Payer: COMMERCIAL

## 2025-04-14 DIAGNOSIS — E11.9 TYPE 2 DIABETES MELLITUS WITHOUT COMPLICATION, WITHOUT LONG-TERM CURRENT USE OF INSULIN (HCC): ICD-10-CM

## 2025-04-14 LAB
ALBUMIN SERPL BCG-MCNC: 4.5 G/DL (ref 3.5–5)
ALP SERPL-CCNC: 60 U/L (ref 34–104)
ALT SERPL W P-5'-P-CCNC: 52 U/L (ref 7–52)
ANION GAP SERPL CALCULATED.3IONS-SCNC: 8 MMOL/L (ref 4–13)
AST SERPL W P-5'-P-CCNC: 31 U/L (ref 13–39)
BILIRUB SERPL-MCNC: 0.63 MG/DL (ref 0.2–1)
BUN SERPL-MCNC: 12 MG/DL (ref 5–25)
CALCIUM SERPL-MCNC: 9.3 MG/DL (ref 8.4–10.2)
CHLORIDE SERPL-SCNC: 103 MMOL/L (ref 96–108)
CHOLEST SERPL-MCNC: 151 MG/DL (ref ?–200)
CO2 SERPL-SCNC: 28 MMOL/L (ref 21–32)
CREAT SERPL-MCNC: 0.69 MG/DL (ref 0.6–1.3)
CREAT UR-MCNC: 172.2 MG/DL
ERYTHROCYTE [DISTWIDTH] IN BLOOD BY AUTOMATED COUNT: 13.1 % (ref 11.6–15.1)
GFR SERPL CREATININE-BSD FRML MDRD: 117 ML/MIN/1.73SQ M
GLUCOSE P FAST SERPL-MCNC: 138 MG/DL (ref 65–99)
HCT VFR BLD AUTO: 47.1 % (ref 36.5–49.3)
HDLC SERPL-MCNC: 56 MG/DL
HGB BLD-MCNC: 15.8 G/DL (ref 12–17)
LDLC SERPL CALC-MCNC: 70 MG/DL (ref 0–100)
MCH RBC QN AUTO: 30.7 PG (ref 26.8–34.3)
MCHC RBC AUTO-ENTMCNC: 33.5 G/DL (ref 31.4–37.4)
MCV RBC AUTO: 92 FL (ref 82–98)
MICROALBUMIN UR-MCNC: 11.6 MG/L
MICROALBUMIN/CREAT 24H UR: 7 MG/G CREATININE (ref 0–30)
PLATELET # BLD AUTO: 308 THOUSANDS/UL (ref 149–390)
PMV BLD AUTO: 10.6 FL (ref 8.9–12.7)
POTASSIUM SERPL-SCNC: 3.7 MMOL/L (ref 3.5–5.3)
PROT SERPL-MCNC: 6.9 G/DL (ref 6.4–8.4)
RBC # BLD AUTO: 5.15 MILLION/UL (ref 3.88–5.62)
SODIUM SERPL-SCNC: 139 MMOL/L (ref 135–147)
TRIGL SERPL-MCNC: 124 MG/DL (ref ?–150)
TSH SERPL DL<=0.05 MIU/L-ACNC: 2.8 UIU/ML (ref 0.45–4.5)
WBC # BLD AUTO: 8.37 THOUSAND/UL (ref 4.31–10.16)

## 2025-04-14 PROCEDURE — 82570 ASSAY OF URINE CREATININE: CPT

## 2025-04-14 PROCEDURE — 80061 LIPID PANEL: CPT

## 2025-04-14 PROCEDURE — 36415 COLL VENOUS BLD VENIPUNCTURE: CPT

## 2025-04-14 PROCEDURE — 80053 COMPREHEN METABOLIC PANEL: CPT

## 2025-04-14 PROCEDURE — 85027 COMPLETE CBC AUTOMATED: CPT

## 2025-04-14 PROCEDURE — 82043 UR ALBUMIN QUANTITATIVE: CPT

## 2025-04-14 PROCEDURE — 84443 ASSAY THYROID STIM HORMONE: CPT

## 2025-04-14 NOTE — TELEPHONE ENCOUNTER
PA for Ozempic, 0.25 or 0.5 MG/DOSE  APPROVED     Date(s) approved 2/11/2025-4/10/2026    Case # [id number]    Patient advised by          [x]MyChart Message  []Phone call   []LMOM  []L/M to call office as no active Communication consent on file  []Unable to leave detailed message as VM not approved on Communication consent       Pharmacy advised by    [x]Fax  []Phone call  []Secure Chat    Specialty Pharmacy    []     Approval letter scanned into Media Yes

## 2025-04-16 ENCOUNTER — RESULTS FOLLOW-UP (OUTPATIENT)
Dept: FAMILY MEDICINE CLINIC | Facility: CLINIC | Age: 40
End: 2025-04-16

## 2025-04-24 ENCOUNTER — CLINICAL SUPPORT (OUTPATIENT)
Dept: FAMILY MEDICINE CLINIC | Facility: CLINIC | Age: 40
End: 2025-04-24

## 2025-04-24 DIAGNOSIS — E11.9 TYPE 2 DIABETES MELLITUS WITHOUT COMPLICATION, WITHOUT LONG-TERM CURRENT USE OF INSULIN (HCC): Primary | ICD-10-CM

## 2025-04-24 PROCEDURE — NURSE

## 2025-05-02 DIAGNOSIS — J45.40 MODERATE PERSISTENT ASTHMA WITHOUT COMPLICATION: ICD-10-CM

## 2025-05-02 RX ORDER — ALBUTEROL SULFATE 90 UG/1
INHALANT RESPIRATORY (INHALATION)
Qty: 18 G | Refills: 2 | Status: SHIPPED | OUTPATIENT
Start: 2025-05-02

## 2025-07-12 DIAGNOSIS — E11.9 TYPE 2 DIABETES MELLITUS WITHOUT COMPLICATION, WITHOUT LONG-TERM CURRENT USE OF INSULIN (HCC): ICD-10-CM

## 2025-07-14 RX ORDER — SEMAGLUTIDE 0.68 MG/ML
INJECTION, SOLUTION SUBCUTANEOUS
Qty: 9 ML | Refills: 1 | Status: SHIPPED | OUTPATIENT
Start: 2025-07-14